# Patient Record
Sex: MALE | Race: WHITE | NOT HISPANIC OR LATINO | Employment: UNEMPLOYED | URBAN - METROPOLITAN AREA
[De-identification: names, ages, dates, MRNs, and addresses within clinical notes are randomized per-mention and may not be internally consistent; named-entity substitution may affect disease eponyms.]

---

## 2017-01-30 ENCOUNTER — OFFICE VISIT (OUTPATIENT)
Dept: ORTHOPEDIC SURGERY | Facility: CLINIC | Age: 37
End: 2017-01-30

## 2017-01-30 VITALS — WEIGHT: 200 LBS | HEIGHT: 68 IN | BODY MASS INDEX: 30.31 KG/M2

## 2017-01-30 DIAGNOSIS — M25.362 KNEE INSTABILITY, LEFT: ICD-10-CM

## 2017-01-30 DIAGNOSIS — M22.2X2 PATELLOFEMORAL STRESS SYNDROME OF LEFT KNEE: Primary | ICD-10-CM

## 2017-01-30 PROCEDURE — 99213 OFFICE O/P EST LOW 20 MIN: CPT | Performed by: NURSE PRACTITIONER

## 2017-01-30 PROCEDURE — 20610 DRAIN/INJ JOINT/BURSA W/O US: CPT | Performed by: NURSE PRACTITIONER

## 2017-01-30 RX ORDER — OXYCODONE AND ACETAMINOPHEN 7.5; 325 MG/1; MG/1
TABLET ORAL
COMMUNITY
Start: 2017-01-13

## 2017-01-30 RX ORDER — ACETAMINOPHEN 500 MG/1
TABLET ORAL
COMMUNITY
Start: 2017-01-26

## 2017-01-30 RX ORDER — GABAPENTIN 800 MG/1
TABLET ORAL
COMMUNITY
Start: 2017-01-26

## 2017-01-30 RX ORDER — DIAZEPAM 5 MG/1
TABLET ORAL
COMMUNITY
Start: 2017-01-12

## 2017-01-30 RX ADMIN — LIDOCAINE HYDROCHLORIDE 4 ML: 10 INJECTION, SOLUTION INFILTRATION; PERINEURAL at 09:33

## 2017-01-30 RX ADMIN — BUPIVACAINE HYDROCHLORIDE 4 ML: 5 INJECTION, SOLUTION EPIDURAL; INTRACAUDAL at 09:33

## 2017-01-30 RX ADMIN — TRIAMCINOLONE ACETONIDE 80 MG: 40 INJECTION, SUSPENSION INTRA-ARTICULAR; INTRAMUSCULAR at 09:33

## 2017-01-30 NOTE — MR AVS SNAPSHOT
Evaristo Colvin   1/30/2017 9:15 AM   Office Visit    Dept Phone:  [phone]   Encounter #:  [id number]    Provider:  OLIMPIA Sage   Department:  [facility]                Your Full Care Plan              Today's Medication Changes          These changes are accurate as of: 1/30/17  9:53 AM.  If you have any questions, ask your nurse or doctor.               Medication(s)that have changed:     oxyCODONE-acetaminophen 7.5-325 MG per tablet   Commonly known as:  PERCOCET   What changed:  Another medication with the same name was removed. Continue taking this medication, and follow the directions you see here.   Changed by:  OLIMPIA Sage                  Your Updated Medication List          This list is accurate as of: 1/30/17  9:53 AM.  Always use your most recent med list.                alfuzosin 10 MG 24 hr tablet   Commonly known as:  UROXATRAL       amLODIPine 5 MG tablet   Commonly known as:  NORVASC       cyclobenzaprine 10 MG tablet   Commonly known as:  FLEXERIL       diazePAM 5 MG tablet   Commonly known as:  VALIUM       EQ ACETAMINOPHEN 500 MG tablet   Generic drug:  acetaminophen       FLUoxetine 20 MG capsule   Commonly known as:  PROzac       * gabapentin 600 MG tablet   Commonly known as:  NEURONTIN       * gabapentin 800 MG tablet   Commonly known as:  NEURONTIN       lisinopril-hydrochlorothiazide 20-12.5 MG per tablet   Commonly known as:  PRINZIDE,ZESTORETIC       lithium carbonate 300 MG capsule       metoprolol tartrate 25 MG tablet   Commonly known as:  LOPRESSOR       nicotine 14 MG/24HR patch   Commonly known as:  NICODERM CQ       OLANZapine 5 MG tablet   Commonly known as:  zyPREXA       oxyCODONE-acetaminophen 7.5-325 MG per tablet   Commonly known as:  PERCOCET       ranitidine 150 MG capsule   Commonly known as:  ZANTAC       tiZANidine 4 MG tablet   Commonly known as:  ZANAFLEX       * Notice:  This list has 2  "medication(s) that are the same as other medications prescribed for you. Read the directions carefully, and ask your doctor or other care provider to review them with you.            We Performed the Following     Large Joint Arthrocentesis       Instructions     None    Patient Instructions History      Upcoming Appointments     Visit Type Date Time Department    FOLLOW UP 2017  9:15 AM [facility]    OFFICE VISIT 2017  2:45 PM [facility]      Consano Signup     [facility] Consano allows you to send messages to your doctor, view your test results, renew your prescriptions, schedule appointments, and more. To sign up, go to [employer] and click on the Sign Up Now link in the New User? box. Enter your Consano Activation Code exactly as it appears below along with the last four digits of your Social Security Number and your Date of Birth () to complete the sign-up process. If you do not sign up before the expiration date, you must request a new code.    Consano Activation Code: [email or web address]  Expires: 2017  9:49 AM    If you have questions, you can email [email or web address] or call [phone] to talk to our Consano staff. Remember, Consano is NOT to be used for urgent needs. For medical emergencies, dial 911.               Other Info from Your Visit           Your Appointments     2017  2:45 PM EST   Office Visit with OLIMPIA Sage   [facility] (--)    [address]  [facility] [address] [phone]           Arrive 15 minutes prior to appointment.              Allergies     No Known Allergies      Reason for Visit     Left Knee - Follow-up, Pain           Vital Signs     Height Weight Body Mass Index Smoking Status          68\" (172.7 cm) 200 lb (90.7 kg) 30.41 kg/m2 Current Every Day Smoker          "

## 2017-01-30 NOTE — PROGRESS NOTES
Subjective:     Patient ID: Evaristo Colvin is a 36 y.o. male.    Chief Complaint: follow-up Left knee pain and swelling    History of Present Illness  Patient is 36 y.o male who presents for follow-up left knee pain and swelling. Pain and swelling present at anterior aspect, increased with all ambulatory activities. Has continued wearing krystyna-pull lite knee brace without relief. Continues to experience increased swelling while wearing brace around anterior knee. Has recently had back surgery at outside facility. Again, unable to take NSAID's because of previous fracture of spine and now post-op. Continues to feel like knee is going to give out on him. Denies presence of numbness or tingling. Denies all other concerns present at this time.      Social History     Occupational History   • Not on file.     Social History Main Topics   • Smoking status: Current Every Day Smoker     Packs/day: 1.00     Years: 20.00   • Smokeless tobacco: Never Used   • Alcohol use No   • Drug use: No   • Sexual activity: Defer      Past Medical History   Diagnosis Date   • Anxiety    • Carpal tunnel syndrome    • Knee sprain    • Lumbosacral disc disease      Past Surgical History   Procedure Laterality Date   • Finger surgery Left 11/2013     MIDDLE       History reviewed. No pertinent family history.      Review of Systems   Constitutional: Negative for chills, diaphoresis, fever and unexpected weight change.   HENT: Negative for hearing loss, nosebleeds, sore throat and tinnitus.    Eyes: Negative for pain and visual disturbance.   Respiratory: Negative for cough, shortness of breath and wheezing.    Cardiovascular: Negative for chest pain and palpitations.   Gastrointestinal: Negative for abdominal pain, diarrhea, nausea and vomiting.   Endocrine: Negative for cold intolerance, heat intolerance and polydipsia.   Genitourinary: Negative for difficulty urinating, dysuria and hematuria.   Musculoskeletal: Positive for arthralgias, back  "pain, joint swelling and myalgias.   Skin: Negative for rash and wound.   Allergic/Immunologic: Negative for environmental allergies.   Neurological: Negative for dizziness, syncope and numbness.   Hematological: Does not bruise/bleed easily.   Psychiatric/Behavioral: Negative for dysphoric mood and sleep disturbance. The patient is not nervous/anxious.            Objective:  Physical Exam    General: No acute distress.  Eyes: conjunctiva clear; pupils equally round and reactive  ENT: external ears and nose atraumatic; oropharynx clear  CV: no peripheral edema  Resp: normal respiratory effort  Skin: no rashes or wounds; normal turgor  Psych: mood and affect appropriate; recent and remote memory intact    Vitals:    01/30/17 0909   Weight: 200 lb (90.7 kg)   Height: 68\" (172.7 cm)     Last 2 weights    01/30/17  0909   Weight: 200 lb (90.7 kg)     Body mass index is 30.41 kg/(m^2).     Left Knee Exam     Tenderness   The patient is experiencing tenderness in the patella.    Range of Motion   Extension: 0   Flexion: 120     Tests   Kanwal:  Medial - negative Lateral - negative  Lachman:  Anterior - trace    Posterior - negative  Drawer:       Anterior - negative     Posterior - negative  Varus: negative  Valgus: negative  Patellar Apprehension: negative    Other   Erythema: absent  Scars: absent  Sensation: normal  Pulse: present  Swelling: mild  Effusion: effusion present    Comments:  Negative for crepitus throughout arc of motion          Assessment:       1. Patellofemoral stress syndrome of left knee    2. Knee instability, left          Plan:  1. Discussed plan of care with patient. Wishes to proceed with corticosteroid injection left knee.  2. Fitted for drytex hinged brace left knee for stability. Will follow-up in four weeks and reassess at that time. Patient verbalized understanding of all information and agrees with plan of care. Denies all other concerns present at this time.     Large Joint " Arthrocentesis  Date/Time: 1/30/2017 9:33 AM  Consent given by: patient  Site marked: site marked  Timeout: Immediately prior to procedure a time out was called to verify the correct patient, procedure, equipment, support staff and site/side marked as required   Supporting Documentation  Indications: pain   Procedure Details  Location: knee - L knee  Preparation: Patient was prepped and draped in the usual sterile fashion  Needle size: 22 G  Approach: superior  Medications administered: 4 mL bupivacaine (PF) 0.5 %; 4 mL lidocaine 1 %; 80 mg triamcinolone acetonide 40 MG/ML  Patient tolerance: patient tolerated the procedure well with no immediate complications          CECILIA query complete.

## 2017-01-31 PROBLEM — M25.369 KNEE INSTABILITY: Status: ACTIVE | Noted: 2017-01-31

## 2017-01-31 RX ORDER — LIDOCAINE HYDROCHLORIDE 10 MG/ML
4 INJECTION, SOLUTION INFILTRATION; PERINEURAL
Status: COMPLETED | OUTPATIENT
Start: 2017-01-30 | End: 2017-01-30

## 2017-01-31 RX ORDER — BUPIVACAINE HYDROCHLORIDE 5 MG/ML
4 INJECTION, SOLUTION EPIDURAL; INTRACAUDAL
Status: COMPLETED | OUTPATIENT
Start: 2017-01-30 | End: 2017-01-30

## 2017-01-31 RX ORDER — TRIAMCINOLONE ACETONIDE 40 MG/ML
80 INJECTION, SUSPENSION INTRA-ARTICULAR; INTRAMUSCULAR
Status: COMPLETED | OUTPATIENT
Start: 2017-01-30 | End: 2017-01-30

## 2023-06-02 ENCOUNTER — OFFICE VISIT (OUTPATIENT)
Dept: FAMILY MEDICINE CLINIC | Facility: CLINIC | Age: 43
End: 2023-06-02
Payer: COMMERCIAL

## 2023-06-02 VITALS
HEART RATE: 94 BPM | SYSTOLIC BLOOD PRESSURE: 112 MMHG | BODY MASS INDEX: 26.52 KG/M2 | OXYGEN SATURATION: 95 % | WEIGHT: 175 LBS | TEMPERATURE: 97.3 F | RESPIRATION RATE: 18 BRPM | HEIGHT: 68 IN | DIASTOLIC BLOOD PRESSURE: 70 MMHG

## 2023-06-02 DIAGNOSIS — Z13.6 ENCOUNTER FOR LIPID SCREENING FOR CARDIOVASCULAR DISEASE: ICD-10-CM

## 2023-06-02 DIAGNOSIS — Z00.00 WELLNESS EXAMINATION: ICD-10-CM

## 2023-06-02 DIAGNOSIS — H66.91 ACUTE OTITIS MEDIA, RIGHT: ICD-10-CM

## 2023-06-02 DIAGNOSIS — G89.29 CHRONIC BILATERAL LOW BACK PAIN WITH LEFT-SIDED SCIATICA: Primary | ICD-10-CM

## 2023-06-02 DIAGNOSIS — Z11.59 ENCOUNTER FOR HEPATITIS C SCREENING TEST FOR LOW RISK PATIENT: ICD-10-CM

## 2023-06-02 DIAGNOSIS — M54.42 CHRONIC BILATERAL LOW BACK PAIN WITH LEFT-SIDED SCIATICA: Primary | ICD-10-CM

## 2023-06-02 DIAGNOSIS — Z13.220 ENCOUNTER FOR LIPID SCREENING FOR CARDIOVASCULAR DISEASE: ICD-10-CM

## 2023-06-02 RX ORDER — AMOXICILLIN AND CLAVULANATE POTASSIUM 875; 125 MG/1; MG/1
1 TABLET, FILM COATED ORAL 2 TIMES DAILY
Qty: 14 TABLET | Refills: 0 | Status: SHIPPED | OUTPATIENT
Start: 2023-06-02

## 2023-06-02 RX ORDER — CYCLOBENZAPRINE HCL 10 MG
10 TABLET ORAL NIGHTLY PRN
Qty: 90 TABLET | Refills: 0 | Status: SHIPPED | OUTPATIENT
Start: 2023-06-02

## 2023-06-02 NOTE — PATIENT INSTRUCTIONS
Labs today - will call with results  Antibiotics as prescribed  Muscle relaxer as prescribed  Follow up for annual physical in 3 months, sooner if needed
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
## 2023-06-03 LAB
ALBUMIN SERPL-MCNC: 5.1 G/DL (ref 4–5)
ALBUMIN/GLOB SERPL: 2 {RATIO} (ref 1.2–2.2)
ALP SERPL-CCNC: 125 IU/L (ref 44–121)
ALT SERPL-CCNC: 14 IU/L (ref 0–44)
AST SERPL-CCNC: 20 IU/L (ref 0–40)
BILIRUB SERPL-MCNC: 0.4 MG/DL (ref 0–1.2)
BUN SERPL-MCNC: 20 MG/DL (ref 6–24)
BUN/CREAT SERPL: 13 (ref 9–20)
CALCIUM SERPL-MCNC: 9.8 MG/DL (ref 8.7–10.2)
CHLORIDE SERPL-SCNC: 104 MMOL/L (ref 96–106)
CHOLEST SERPL-MCNC: 162 MG/DL (ref 100–199)
CO2 SERPL-SCNC: 21 MMOL/L (ref 20–29)
CREAT SERPL-MCNC: 1.5 MG/DL (ref 0.76–1.27)
EGFRCR SERPLBLD CKD-EPI 2021: 59 ML/MIN/1.73
ERYTHROCYTE [DISTWIDTH] IN BLOOD BY AUTOMATED COUNT: 12.5 % (ref 11.6–15.4)
GLOBULIN SER CALC-MCNC: 2.5 G/DL (ref 1.5–4.5)
GLUCOSE SERPL-MCNC: 75 MG/DL (ref 70–99)
HCT VFR BLD AUTO: 47.2 % (ref 37.5–51)
HCV IGG SERPL QL IA: NON REACTIVE
HDLC SERPL-MCNC: 42 MG/DL
HGB BLD-MCNC: 15.4 G/DL (ref 13–17.7)
LDLC SERPL CALC-MCNC: 101 MG/DL (ref 0–99)
MCH RBC QN AUTO: 30 PG (ref 26.6–33)
MCHC RBC AUTO-ENTMCNC: 32.6 G/DL (ref 31.5–35.7)
MCV RBC AUTO: 92 FL (ref 79–97)
PLATELET # BLD AUTO: 281 X10E3/UL (ref 150–450)
POTASSIUM SERPL-SCNC: 5.1 MMOL/L (ref 3.5–5.2)
PROT SERPL-MCNC: 7.6 G/DL (ref 6–8.5)
RBC # BLD AUTO: 5.13 X10E6/UL (ref 4.14–5.8)
SODIUM SERPL-SCNC: 143 MMOL/L (ref 134–144)
TRIGL SERPL-MCNC: 105 MG/DL (ref 0–149)
VLDLC SERPL CALC-MCNC: 19 MG/DL (ref 5–40)
WBC # BLD AUTO: 13.3 X10E3/UL (ref 3.4–10.8)

## 2023-06-06 DIAGNOSIS — R94.4 DECREASED GFR: Primary | ICD-10-CM

## 2023-06-07 ENCOUNTER — TELEPHONE (OUTPATIENT)
Dept: FAMILY MEDICINE CLINIC | Facility: CLINIC | Age: 43
End: 2023-06-07
Payer: COMMERCIAL

## 2023-06-07 NOTE — TELEPHONE ENCOUNTER
If tylenol does not  help at all, I would recommend pain management eval to see if there is another alternative.  Let me know if he would like to proceed with this.

## 2023-06-07 NOTE — TELEPHONE ENCOUNTER
----- Message from Aimee Stover MA sent at 6/6/2023  4:38 PM EDT -----  He takes a lot of ibuprofen anything else to help other than tylenol. Tylenol doesn't help at all.

## 2023-07-26 ENCOUNTER — HOSPITAL ENCOUNTER (OUTPATIENT)
Dept: GENERAL RADIOLOGY | Facility: HOSPITAL | Age: 43
Discharge: HOME | End: 2023-07-26
Payer: COMMERCIAL

## 2023-07-26 ENCOUNTER — OFFICE VISIT (OUTPATIENT)
Dept: FAMILY MEDICINE CLINIC | Facility: CLINIC | Age: 43
End: 2023-07-26
Payer: COMMERCIAL

## 2023-07-26 VITALS
HEART RATE: 80 BPM | TEMPERATURE: 98.4 F | WEIGHT: 183 LBS | SYSTOLIC BLOOD PRESSURE: 116 MMHG | BODY MASS INDEX: 27.74 KG/M2 | DIASTOLIC BLOOD PRESSURE: 80 MMHG | HEIGHT: 68 IN | RESPIRATION RATE: 18 BRPM

## 2023-07-26 DIAGNOSIS — F41.9 ANXIETY AND DEPRESSION: ICD-10-CM

## 2023-07-26 DIAGNOSIS — F15.90 STIMULANT USE DISORDER: ICD-10-CM

## 2023-07-26 DIAGNOSIS — M77.01 MEDIAL EPICONDYLITIS OF RIGHT ELBOW: Primary | ICD-10-CM

## 2023-07-26 DIAGNOSIS — M79.644 PAIN OF RIGHT MIDDLE FINGER: ICD-10-CM

## 2023-07-26 DIAGNOSIS — R68.82 DECREASED LIBIDO: ICD-10-CM

## 2023-07-26 DIAGNOSIS — R53.83 OTHER FATIGUE: ICD-10-CM

## 2023-07-26 DIAGNOSIS — F32.A ANXIETY AND DEPRESSION: ICD-10-CM

## 2023-07-26 PROCEDURE — 73140 X-RAY EXAM OF FINGER(S): CPT

## 2023-07-26 PROCEDURE — 1159F MED LIST DOCD IN RCRD: CPT

## 2023-07-26 PROCEDURE — 1160F RVW MEDS BY RX/DR IN RCRD: CPT

## 2023-07-26 PROCEDURE — 99214 OFFICE O/P EST MOD 30 MIN: CPT
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
## 2023-07-26 NOTE — PATIENT INSTRUCTIONS
Labs today - will call with results  Diclofenac as prescribed. Referral to OT.   Xray ordered of right middle finger.   Follow up in 3 weeks to recheck on depression / anxiety, sooner if needed. Plan to repeat labs (CMP) at that visit.

## 2023-07-27 LAB
25(OH)D3+25(OH)D2 SERPL-MCNC: 22.1 NG/ML (ref 30–100)
ERYTHROCYTE [DISTWIDTH] IN BLOOD BY AUTOMATED COUNT: 12.6 % (ref 12.3–15.4)
HCT VFR BLD AUTO: 42.9 % (ref 37.5–51)
HGB BLD-MCNC: 14.5 G/DL (ref 13–17.7)
MCH RBC QN AUTO: 30.5 PG (ref 26.6–33)
MCHC RBC AUTO-ENTMCNC: 33.8 G/DL (ref 31.5–35.7)
MCV RBC AUTO: 90.1 FL (ref 79–97)
PLATELET # BLD AUTO: 214 10*3/MM3 (ref 140–450)
RBC # BLD AUTO: 4.76 10*6/MM3 (ref 4.14–5.8)
TESTOST SERPL-MCNC: 279 NG/DL (ref 264–916)
TSH SERPL DL<=0.005 MIU/L-ACNC: 2.93 UIU/ML (ref 0.27–4.2)
VIT B12 SERPL-MCNC: 409 PG/ML (ref 211–946)
WBC # BLD AUTO: 7.17 10*3/MM3 (ref 3.4–10.8)

## 2023-07-31 ENCOUNTER — TELEPHONE (OUTPATIENT)
Dept: FAMILY MEDICINE CLINIC | Facility: CLINIC | Age: 43
End: 2023-07-31
Payer: COMMERCIAL

## 2023-08-01 DIAGNOSIS — M77.01 MEDIAL EPICONDYLITIS OF RIGHT ELBOW: ICD-10-CM

## 2023-08-03 ENCOUNTER — OFFICE VISIT (OUTPATIENT)
Dept: FAMILY MEDICINE CLINIC | Facility: CLINIC | Age: 43
End: 2023-08-03
Payer: COMMERCIAL

## 2023-08-03 VITALS
TEMPERATURE: 97.5 F | SYSTOLIC BLOOD PRESSURE: 116 MMHG | HEIGHT: 68 IN | RESPIRATION RATE: 18 BRPM | HEART RATE: 91 BPM | OXYGEN SATURATION: 97 % | BODY MASS INDEX: 27.58 KG/M2 | DIASTOLIC BLOOD PRESSURE: 90 MMHG | WEIGHT: 182 LBS

## 2023-08-03 DIAGNOSIS — N52.9 ERECTILE DYSFUNCTION, UNSPECIFIED ERECTILE DYSFUNCTION TYPE: Primary | ICD-10-CM

## 2023-08-03 PROCEDURE — 1159F MED LIST DOCD IN RCRD: CPT

## 2023-08-03 PROCEDURE — 99213 OFFICE O/P EST LOW 20 MIN: CPT

## 2023-08-03 PROCEDURE — 1160F RVW MEDS BY RX/DR IN RCRD: CPT

## 2023-08-03 RX ORDER — TADALAFIL 10 MG/1
10 TABLET ORAL DAILY PRN
Qty: 10 TABLET | Refills: 1 | Status: SHIPPED | OUTPATIENT
Start: 2023-08-03

## 2023-08-03 NOTE — PROGRESS NOTES
"Chief Complaint   Patient presents with    Erectile Dysfunction     For a while, x 1 year getting worse.        Subjective      Evaristo Colvin is a 42 y.o. who presents for follow up on ED.  Labs were normal, but he continues to struggle with ED.  No pain. No abnormal curvature or change in shape of penis.      The following portions of the patient's history were reviewed and updated as appropriate: allergies, current medications, past family history, past medical history, past social history, past surgical history, and problem list.    Review of Systems   Constitutional:  Negative for chills and fever.   Respiratory:  Negative for chest tightness and shortness of breath.    Cardiovascular: Negative.    Genitourinary:  Positive for erectile dysfunction. Negative for discharge, penile pain, penile swelling, scrotal swelling and testicular pain.   Psychiatric/Behavioral:  Positive for suicidal ideas.      Objective   Vital Signs:  /90   Pulse 91   Temp 97.5 øF (36.4 øC)   Resp 18   Ht 172.7 cm (68\")   Wt 82.6 kg (182 lb)   SpO2 97%   BMI 27.67 kg/mý             Physical Exam  Vitals and nursing note reviewed.   Constitutional:       Appearance: Normal appearance.   Cardiovascular:      Rate and Rhythm: Normal rate and regular rhythm.      Heart sounds: Normal heart sounds.   Pulmonary:      Breath sounds: Normal breath sounds.   Genitourinary:     Penis: Normal.       Testes: Normal.   Neurological:      General: No focal deficit present.      Mental Status: He is alert and oriented to person, place, and time.   Psychiatric:         Mood and Affect: Mood normal.         Behavior: Behavior normal.        Result Review                     Assessment and Plan  Diagnoses and all orders for this visit:    1. Erectile dysfunction, unspecified erectile dysfunction type (Primary)  -     tadalafil (Cialis) 10 MG tablet; Take 1 tablet by mouth Daily As Needed for Erectile Dysfunction.  Dispense: 10 tablet; " Refill: 1  -     Testosterone      After discussion with patient regarding option for referral to urology versus trying medication, he opts to try medication.  We discussed risks and benefits of medication and patient verbalized understanding. The patient was educated about tadalafil.  He was counseled on appropriate use, timing and the need for sexual stimulation.  In addition, he understands not to use this medication with nitrates. He was instructed to take the medication about 1 hour prior to sexual activity.  Side effects were explained to the patient. Lastly, he was instructed to go immediately to his nearest emergency room in the event he developed an erection lasting longer than 3 hours. He voiced understanding of all these instructions and the importance of seeking prompt medical attention for an erection lasting longer than 3 hours.     Follow up in 1 month for recheck. Consider increasing dose versus referral to urology if ineffective.         Patient Instructions   Medication as prescribed.   Follow up as planned in 4 weeks     Follow Up  No follow-ups on file.  Patient was given instructions and counseling regarding his condition or for health maintenance advice. Please see specific information pulled into the AVS if appropriate.

## 2023-08-04 LAB — TESTOST SERPL-MCNC: 319 NG/DL (ref 264–916)

## 2023-08-30 ENCOUNTER — TELEPHONE (OUTPATIENT)
Dept: FAMILY MEDICINE CLINIC | Facility: CLINIC | Age: 43
End: 2023-08-30
Payer: COMMERCIAL

## 2023-08-30 NOTE — TELEPHONE ENCOUNTER
Patient sees me next week. Try to see if there is an appointment available at the end of this week instead so we can discuss and make a plan. If not, keep appointment next week as planned.

## 2023-08-30 NOTE — TELEPHONE ENCOUNTER
Caller: Evaristo Colvin    Relationship: Self    Best call back number: [phone]     What is the medical concern/diagnosis: HAND ISSUES     What specialty or service is being requested: HAND SPECIALIST / ORTHOPEDIC SURGERY     What is the provider, practice or medical service name: [facility]     What is the office location: [facility]       Any additional details: ADRIANA RING REFERRED PATIENT TO PHYSICAL THERAPY, AND HIS PHYSICAL THERAPIST STATED HE NEEDS TO BE REFERRED TO A HAND SPECIALIST.    PATIENT STATED HE WAS TOLD THAT ANDREINA ALKA WOULD HAVE A LOCATION IN [place] AND IF SO HE WOULD LIKE TO BE SCHEDULED IN THAT LOCATION.    IF NOT HE IS OK BEING SCHEDULED WITH SOMEONE ELSE IN [place]    HE WAS ALSO TOLD THAT HE NEEDS IMAGING DONE ASAP, HE IS NOT SURE IF ITS XRAY OR MRI. PLEASE ADVISE AND CALL PATIENT

## 2023-08-31 DIAGNOSIS — M54.42 CHRONIC BILATERAL LOW BACK PAIN WITH LEFT-SIDED SCIATICA: ICD-10-CM

## 2023-08-31 DIAGNOSIS — M77.01 MEDIAL EPICONDYLITIS OF RIGHT ELBOW: ICD-10-CM

## 2023-08-31 DIAGNOSIS — G89.29 CHRONIC BILATERAL LOW BACK PAIN WITH LEFT-SIDED SCIATICA: ICD-10-CM

## 2023-08-31 NOTE — TELEPHONE ENCOUNTER
Caller: Evaristo Colvin    Relationship: Self    Best call back number: [phone] - OK TO LEAVE DETAILED MESSAGE IF NO ANSWER  ALSO OK TO RESPOND THRU MYCHART- PREFERS PHONE CALLS    Requested Prescriptions:   Requested Prescriptions     Pending Prescriptions Disp Refills    cyclobenzaprine (FLEXERIL) 10 MG tablet 90 tablet 0     Sig: Take 1 tablet by mouth At Night As Needed for Muscle Spasms.    diclofenac (VOLTAREN) 50 MG EC tablet 60 tablet 0     Sig: Take 1 tablet by mouth 2 (Two) Times a Day.        Pharmacy where request should be sent:  [facility]/pharmacy #[id number] - [place], [place] - [address] AT [facility] [address] - [phone]  - [phone] FX     Last office visit with prescribing clinician: 8/3/2023   Last telemedicine visit with prescribing clinician: Visit date not found   Next office visit with prescribing clinician: 9/5/2023     Additional details provided by patient: PLEASE SEND 90 DAY SUPPLY WITH REFILLS. ASKING IF CYCLOBENZAPRINE CAN BE INCREASED TO TWICE PER DAY.    Does the patient have less than a 3 day supply:  [x] Yes  SUNDAY WILL BE LAST DOSE    If the office needs to give you a call back, can they leave a voicemail: [x] Yes [] No    Alejandra Monreal Rep   08/31/23 10:24 EDT

## 2023-09-01 RX ORDER — CYCLOBENZAPRINE HCL 10 MG
10 TABLET ORAL 2 TIMES DAILY PRN
Qty: 90 TABLET | Refills: 0 | Status: SHIPPED | OUTPATIENT
Start: 2023-09-01

## 2023-09-05 ENCOUNTER — OFFICE VISIT (OUTPATIENT)
Dept: FAMILY MEDICINE CLINIC | Facility: CLINIC | Age: 43
End: 2023-09-05
Payer: COMMERCIAL

## 2023-09-05 VITALS
BODY MASS INDEX: 31.22 KG/M2 | OXYGEN SATURATION: 95 % | RESPIRATION RATE: 14 BRPM | SYSTOLIC BLOOD PRESSURE: 112 MMHG | WEIGHT: 206 LBS | TEMPERATURE: 97.1 F | DIASTOLIC BLOOD PRESSURE: 78 MMHG | HEIGHT: 68 IN | HEART RATE: 90 BPM

## 2023-09-05 DIAGNOSIS — M77.01 MEDIAL EPICONDYLITIS OF RIGHT ELBOW: ICD-10-CM

## 2023-09-05 DIAGNOSIS — M79.601 RIGHT ARM PAIN: ICD-10-CM

## 2023-09-05 DIAGNOSIS — R30.0 DYSURIA: ICD-10-CM

## 2023-09-05 DIAGNOSIS — D72.829 LEUKOCYTOSIS, UNSPECIFIED TYPE: ICD-10-CM

## 2023-09-05 DIAGNOSIS — Z00.00 WELLNESS EXAMINATION: Primary | ICD-10-CM

## 2023-09-05 DIAGNOSIS — R94.4 DECREASED GFR: ICD-10-CM

## 2023-09-05 DIAGNOSIS — K21.9 GASTROESOPHAGEAL REFLUX DISEASE, UNSPECIFIED WHETHER ESOPHAGITIS PRESENT: ICD-10-CM

## 2023-09-05 DIAGNOSIS — N52.9 ERECTILE DYSFUNCTION, UNSPECIFIED ERECTILE DYSFUNCTION TYPE: ICD-10-CM

## 2023-09-05 LAB
ALBUMIN SERPL-MCNC: 4.5 G/DL (ref 3.5–5.2)
ALBUMIN/GLOB SERPL: 2.4 G/DL
ALP SERPL-CCNC: 94 U/L (ref 39–117)
ALT SERPL-CCNC: 48 U/L (ref 1–41)
AST SERPL-CCNC: 33 U/L (ref 1–40)
BASOPHILS # BLD AUTO: 0.08 10*3/MM3 (ref 0–0.2)
BASOPHILS NFR BLD AUTO: 0.9 % (ref 0–1.5)
BILIRUB BLD-MCNC: NEGATIVE MG/DL
BILIRUB SERPL-MCNC: <0.2 MG/DL (ref 0–1.2)
BUN SERPL-MCNC: 15 MG/DL (ref 6–20)
BUN/CREAT SERPL: 15.6 (ref 7–25)
CALCIUM SERPL-MCNC: 9.8 MG/DL (ref 8.6–10.5)
CHLORIDE SERPL-SCNC: 104 MMOL/L (ref 98–107)
CLARITY, POC: CLEAR
CO2 SERPL-SCNC: 28.9 MMOL/L (ref 22–29)
COLOR UR: YELLOW
CREAT SERPL-MCNC: 0.96 MG/DL (ref 0.76–1.27)
EGFRCR SERPLBLD CKD-EPI 2021: 100.6 ML/MIN/1.73
EOSINOPHIL # BLD AUTO: 0.2 10*3/MM3 (ref 0–0.4)
EOSINOPHIL NFR BLD AUTO: 2.2 % (ref 0.3–6.2)
ERYTHROCYTE [DISTWIDTH] IN BLOOD BY AUTOMATED COUNT: 13 % (ref 12.3–15.4)
GLOBULIN SER CALC-MCNC: 1.9 GM/DL
GLUCOSE SERPL-MCNC: 98 MG/DL (ref 65–99)
GLUCOSE UR STRIP-MCNC: NEGATIVE MG/DL
HCT VFR BLD AUTO: 41.7 % (ref 37.5–51)
HGB BLD-MCNC: 14.2 G/DL (ref 13–17.7)
IMM GRANULOCYTES # BLD AUTO: 0.07 10*3/MM3 (ref 0–0.05)
IMM GRANULOCYTES NFR BLD AUTO: 0.8 % (ref 0–0.5)
KETONES UR QL: NEGATIVE
LEUKOCYTE EST, POC: NEGATIVE
LYMPHOCYTES # BLD AUTO: 2.39 10*3/MM3 (ref 0.7–3.1)
LYMPHOCYTES NFR BLD AUTO: 25.7 % (ref 19.6–45.3)
MCH RBC QN AUTO: 31.4 PG (ref 26.6–33)
MCHC RBC AUTO-ENTMCNC: 34.1 G/DL (ref 31.5–35.7)
MCV RBC AUTO: 92.3 FL (ref 79–97)
MONOCYTES # BLD AUTO: 0.72 10*3/MM3 (ref 0.1–0.9)
MONOCYTES NFR BLD AUTO: 7.7 % (ref 5–12)
NEUTROPHILS # BLD AUTO: 5.84 10*3/MM3 (ref 1.7–7)
NEUTROPHILS NFR BLD AUTO: 62.7 % (ref 42.7–76)
NITRITE UR-MCNC: NEGATIVE MG/ML
NRBC BLD AUTO-RTO: 0.2 /100 WBC (ref 0–0.2)
PH UR: 6 [PH] (ref 5–8)
PLATELET # BLD AUTO: 216 10*3/MM3 (ref 140–450)
POTASSIUM SERPL-SCNC: 5.1 MMOL/L (ref 3.5–5.2)
PROT SERPL-MCNC: 6.4 G/DL (ref 6–8.5)
PROT UR STRIP-MCNC: NEGATIVE MG/DL
RBC # BLD AUTO: 4.52 10*6/MM3 (ref 4.14–5.8)
RBC # UR STRIP: NEGATIVE /UL
SODIUM SERPL-SCNC: 140 MMOL/L (ref 136–145)
SP GR UR: 1.03 (ref 1–1.03)
UROBILINOGEN UR QL: NORMAL
WBC # BLD AUTO: 9.3 10*3/MM3 (ref 3.4–10.8)

## 2023-09-05 RX ORDER — TRAZODONE HYDROCHLORIDE 100 MG/1
50-100 TABLET ORAL
COMMUNITY
Start: 2023-08-31

## 2023-09-05 RX ORDER — OMEPRAZOLE 20 MG/1
20 CAPSULE, DELAYED RELEASE ORAL DAILY
Qty: 30 CAPSULE | Refills: 0 | Status: SHIPPED | OUTPATIENT
Start: 2023-09-05

## 2023-09-05 RX ORDER — ARIPIPRAZOLE 5 MG/1
1 TABLET ORAL DAILY
COMMUNITY
Start: 2023-09-01

## 2023-09-05 RX ORDER — NICOTINE 21 MG/24HR
1 PATCH, TRANSDERMAL 24 HOURS TRANSDERMAL EVERY 24 HOURS
COMMUNITY
Start: 2023-08-16

## 2023-09-05 NOTE — PROGRESS NOTES
Chief Complaint   Patient presents with    Annual Exam       Subjective      Evaristo Colvin is a 42 y.o. who presents for follow up on right elbow pain.  States OT advised him to see specialist at this point. No improvement with 6 weeks OT at [facility] hand [facility] physical therapy.   Seeing psychiatrist and they put him on abilify.  States he follows up again in 2 weeks.   Has gained about 24 lbs in a month or so.  Continues to have some anxiety.   No issues with cravings, starting to taper suboxone.   Cialis - works sometimes.  Sometimes it does not work.  States it may be depression/anxiety more than ED at this point.   Did start going to gym; does his OT as well.       The following portions of the patient's history were reviewed and updated as appropriate: allergies, current medications, past family history, past medical history, past social history, past surgical history, and problem list.    Review of Systems   Constitutional:  Negative for chills and fever.   HENT: Negative.     Eyes: Negative.    Respiratory:  Negative for chest tightness and shortness of breath.    Cardiovascular:  Positive for chest pain (with anxiety sometimes). Negative for palpitations.   Gastrointestinal:  Positive for GERD. Negative for abdominal pain, blood in stool, constipation, diarrhea, nausea and vomiting.   Genitourinary:  Positive for dysuria and frequency. Negative for difficulty urinating, discharge, hematuria, nocturia, penile pain, penile swelling, scrotal swelling and testicular pain.   Musculoskeletal:  Positive for back pain (chronic lower, history of surgery).   Neurological:  Positive for dizziness (with changing positions sometimes - if he stands up too fast) and numbness (right arm and hand sometimes). Negative for headache.   Psychiatric/Behavioral:  Positive for sleep disturbance (not sleeping well d/t anxiety), depressed mood and stress. Negative for suicidal ideas. The patient is nervous/anxious.   "    Objective   Vital Signs:  /78   Pulse 90   Temp 97.1 °F (36.2 °C)   Resp 14   Ht 172.7 cm (68\")   Wt 93.4 kg (206 lb)   SpO2 95%   BMI 31.32 kg/m²     BMI is >= 30 and <35. (Class 1 Obesity). The following options were offered after discussion;: exercise counseling/recommendations and nutrition counseling/recommendations        Physical Exam  Vitals and nursing note reviewed.   Constitutional:       General: He is not in acute distress.     Appearance: Normal appearance. He is not ill-appearing.   HENT:      Head: Normocephalic and atraumatic.      Right Ear: Tympanic membrane, ear canal and external ear normal.      Left Ear: Tympanic membrane, ear canal and external ear normal.      Nose: Nose normal.      Mouth/Throat:      Mouth: Mucous membranes are moist.      Pharynx: No oropharyngeal exudate or posterior oropharyngeal erythema.   Eyes:      General:         Right eye: No discharge.         Left eye: No discharge.      Extraocular Movements: Extraocular movements intact.      Pupils: Pupils are equal, round, and reactive to light.   Cardiovascular:      Rate and Rhythm: Normal rate and regular rhythm.      Pulses: Normal pulses.      Heart sounds: Normal heart sounds.   Pulmonary:      Effort: Pulmonary effort is normal.      Breath sounds: Normal breath sounds.   Abdominal:      General: Bowel sounds are normal.      Palpations: Abdomen is soft. There is no mass.      Tenderness: There is no abdominal tenderness. There is no guarding.   Musculoskeletal:         General: Normal range of motion.      Right elbow: No swelling or deformity. Tenderness present in medial epicondyle.      Cervical back: Normal range of motion.      Comments: Elbow - wearing brace from OT that limits ROM    Skin:     General: Skin is warm and dry.   Neurological:      General: No focal deficit present.      Mental Status: He is alert and oriented to person, place, and time.        Result Review                   "   Assessment and Plan  Diagnoses and all orders for this visit:    1. Wellness examination (Primary)    2. Medial epicondylitis of right elbow  -     Ambulatory Referral to Hand Surgery    3. Right arm pain  -     Ambulatory Referral to Hand Surgery    4. Dysuria  -     POC Urinalysis Dipstick    5. Decreased GFR  -     Comprehensive Metabolic Panel    6. Leukocytosis, unspecified type  -     CBC & Differential    7. Gastroesophageal reflux disease, unspecified whether esophagitis present  -     omeprazole (priLOSEC) 20 MG capsule; Take 1 capsule by mouth Daily.  Dispense: 30 capsule; Refill: 0    8. Erectile dysfunction, unspecified erectile dysfunction type        Consider referral to urology at follow up. Negative U/A today.  Patient is agreeable with waiting for follow up to see if symptoms resolve.  Patient believes dysuria could be result of new medication, Abilify.  Medial epicondylitis/right arm pain-referral to hand surgery.  Continue OT.  Patient agrees with plan  Decreased GFR/leukocytosis-repeat labs today, will call with results.  GERD-omeprazole as prescribed.  Patient to follow-up in 6 weeks.  ED-states Cialis works some of the time, possibility anxiety/depression are contributing to ED.  Patient would like to wait until follow-up.  Consider referral to urology/changing Cialis to different medication.        There are no Patient Instructions on file for this visit.    Discussed medications prescribed and OTC medications recommended.  Discussed medication safety, possible side effects and how to take or administer medications. Instructed patient to report any adverse reactions, side effects or concerns.     Reviewed physical exam findings and plan with patient who verbalized understanding and agrees with plan of care. Patient was given opportunity to ask questions and all concerns were addressed prior to the conclusion of today's visit.     Follow Up  Return in about 6 weeks (around 10/17/2023) for  Recheck ED / anxiety/depression.  Patient was given instructions and counseling regarding his condition or for health maintenance advice. Please see specific information pulled into the AVS if appropriate.     Note to patient: The 21st Century Cures Act makes medical notes like these available to patients in the interest of transparency. However, be advised this is a medical document. It is intended as peer to peer communication. It is written in medical language and may contain abbreviations or verbiage that are unfamiliar. It may appear blunt or direct. Medical documents are intended to carry relevant information, facts as evident, and the clinical opinion of the provider.

## 2023-09-06 DIAGNOSIS — K21.9 GASTROESOPHAGEAL REFLUX DISEASE, UNSPECIFIED WHETHER ESOPHAGITIS PRESENT: ICD-10-CM

## 2023-09-07 RX ORDER — OMEPRAZOLE 20 MG/1
CAPSULE, DELAYED RELEASE ORAL
Qty: 30 CAPSULE | Refills: 0 | OUTPATIENT
Start: 2023-09-07

## 2023-09-21 ENCOUNTER — TELEPHONE (OUTPATIENT)
Dept: FAMILY MEDICINE CLINIC | Facility: CLINIC | Age: 43
End: 2023-09-21
Payer: COMMERCIAL

## 2023-09-21 NOTE — TELEPHONE ENCOUNTER
Caller: Evaristo Colvin    Relationship: Self    Best call back number: [phone]    What medication are you requesting: ANTI-BIOTICS     What are your current symptoms: SORE THROAT, EARACHE, FEVER, CONGESTION     How long have you been experiencing symptoms: 2 DAYS     Have you had these symptoms before:    [x] Yes  [] No    Have you been treated for these symptoms before:   [x] Yes  [] No    If a prescription is needed, what is your preferred pharmacy and phone number: [id number] - [place], [place] - [address] [place] [address] AT [place] [address] - [phone]  - [phone]      Additional notes: PATIENT'S DAUGHTER WAS DIAGNOSED WITH STREP B LAST WEEK

## 2023-09-21 NOTE — TELEPHONE ENCOUNTER
Patient needs eval in clinic with me. I have same day spots available tomorrow. Otherwise, he can go to [facility] for further evaluation.

## 2023-09-21 NOTE — TELEPHONE ENCOUNTER
PATIENT CALLED BACK. NO ANSWER AT OFFICE. HE STATED HE HAS NOT BEEN TESTED FOR ANYTHING YET.  PLEASE CALL PATIENT BACK TO [facility].  THANK YOU.

## 2023-09-22 ENCOUNTER — OFFICE VISIT (OUTPATIENT)
Dept: FAMILY MEDICINE CLINIC | Facility: CLINIC | Age: 43
End: 2023-09-22
Payer: COMMERCIAL

## 2023-09-22 VITALS
HEART RATE: 102 BPM | HEIGHT: 68 IN | WEIGHT: 212 LBS | TEMPERATURE: 97.8 F | DIASTOLIC BLOOD PRESSURE: 80 MMHG | OXYGEN SATURATION: 99 % | SYSTOLIC BLOOD PRESSURE: 136 MMHG | RESPIRATION RATE: 18 BRPM | BODY MASS INDEX: 32.13 KG/M2

## 2023-09-22 DIAGNOSIS — H66.001 ACUTE SUPPURATIVE OTITIS MEDIA OF RIGHT EAR WITHOUT SPONTANEOUS RUPTURE OF TYMPANIC MEMBRANE, RECURRENCE NOT SPECIFIED: ICD-10-CM

## 2023-09-22 DIAGNOSIS — J02.9 SORE THROAT: Primary | ICD-10-CM

## 2023-09-22 LAB
EXPIRATION DATE: NORMAL
INTERNAL CONTROL: NORMAL
Lab: NORMAL
S PYO AG THROAT QL: NEGATIVE

## 2023-09-22 PROCEDURE — 99213 OFFICE O/P EST LOW 20 MIN: CPT

## 2023-09-22 PROCEDURE — 87880 STREP A ASSAY W/OPTIC: CPT

## 2023-09-22 RX ORDER — HYDROXYZINE HYDROCHLORIDE 25 MG/1
25-50 TABLET, FILM COATED ORAL
COMMUNITY
Start: 2023-09-14

## 2023-09-22 RX ORDER — LAMOTRIGINE 25 MG/1
TABLET ORAL
COMMUNITY
Start: 2023-09-14

## 2023-09-22 RX ORDER — IBUPROFEN 800 MG/1
TABLET ORAL
COMMUNITY
Start: 2023-09-19

## 2023-09-22 RX ORDER — AMOXICILLIN AND CLAVULANATE POTASSIUM 875; 125 MG/1; MG/1
1 TABLET, FILM COATED ORAL 2 TIMES DAILY
Qty: 20 TABLET | Refills: 0 | Status: SHIPPED | OUTPATIENT
Start: 2023-09-22

## 2023-09-22 NOTE — PROGRESS NOTES
"Chief Complaint   Patient presents with    Nasal Congestion     X 3 days with sore throat and can't hear out of rt ear, productive coughing  Daughter was strep B       Subjective      Evaristo Colvin is a 42 y.o. who presents for sore throat, congestion and body aches.  States he has been taking dayquil and ibuprofen.   Daughter has strep.  Still has sore throat.      The following portions of the patient's history were reviewed and updated as appropriate: allergies, current medications, past family history, past medical history, past social history, past surgical history, and problem list.    Review of Systems   Constitutional:  Negative for chills and fever.   HENT:  Positive for congestion and sore throat.    Respiratory:  Negative for chest tightness and shortness of breath.    Cardiovascular:  Negative for chest pain and palpitations.   Musculoskeletal:  Positive for myalgias.     Objective   Vital Signs:  /80   Pulse 102   Temp 97.8 °F (36.6 °C)   Resp 18   Ht 172.7 cm (68\")   Wt 96.2 kg (212 lb)   SpO2 99%   BMI 32.23 kg/m²               Physical Exam  Vitals and nursing note reviewed.   Constitutional:       Appearance: Normal appearance.   HENT:      Right Ear: Ear canal and external ear normal. A middle ear effusion (yellowish) is present. Tympanic membrane is erythematous and bulging.      Left Ear: Tympanic membrane, ear canal and external ear normal.      Nose: Nose normal.      Mouth/Throat:      Pharynx: Posterior oropharyngeal erythema present.   Cardiovascular:      Rate and Rhythm: Normal rate and regular rhythm.      Heart sounds: Normal heart sounds.   Pulmonary:      Breath sounds: Normal breath sounds.   Neurological:      Mental Status: He is alert.        Result Review                     Assessment and Plan  Diagnoses and all orders for this visit:    1. Sore throat (Primary)  -     POCT rapid strep A    2. Acute suppurative otitis media of right ear without spontaneous rupture " of tympanic membrane, recurrence not specified  -     amoxicillin-clavulanate (AUGMENTIN) 875-125 MG per tablet; Take 1 tablet by mouth 2 (Two) Times a Day.  Dispense: 20 tablet; Refill: 0      Negative strep in clinic.  Patient PE consistent with otitis media. Medication as prescribed. Follow up if worsening or no improvement.         Discussed medications prescribed and OTC medications recommended.  Discussed medication safety, possible side effects and how to take or administer medications. Instructed patient to report any adverse reactions, side effects or concerns.     Reviewed physical exam findings and plan with patient who verbalized understanding and agrees with plan of care. Patient was given opportunity to ask questions and all concerns were addressed prior to the conclusion of today's visit.     Follow Up  No follow-ups on file.  Patient was given instructions and counseling regarding his condition or for health maintenance advice. Please see specific information pulled into the AVS if appropriate.     Note to patient: The 21st Century Cures Act makes medical notes like these available to patients in the interest of transparency. However, be advised this is a medical document. It is intended as peer to peer communication. It is written in medical language and may contain abbreviations or verbiage that are unfamiliar. It may appear blunt or direct. Medical documents are intended to carry relevant information, facts as evident, and the clinical opinion of the provider.

## 2023-09-30 DIAGNOSIS — M54.42 CHRONIC BILATERAL LOW BACK PAIN WITH LEFT-SIDED SCIATICA: ICD-10-CM

## 2023-09-30 DIAGNOSIS — K21.9 GASTROESOPHAGEAL REFLUX DISEASE, UNSPECIFIED WHETHER ESOPHAGITIS PRESENT: ICD-10-CM

## 2023-09-30 DIAGNOSIS — G89.29 CHRONIC BILATERAL LOW BACK PAIN WITH LEFT-SIDED SCIATICA: ICD-10-CM

## 2023-10-03 RX ORDER — CYCLOBENZAPRINE HCL 10 MG
TABLET ORAL
Qty: 90 TABLET | Refills: 0 | Status: SHIPPED | OUTPATIENT
Start: 2023-10-03

## 2023-10-03 RX ORDER — OMEPRAZOLE 20 MG/1
CAPSULE, DELAYED RELEASE ORAL
Qty: 30 CAPSULE | Refills: 3 | Status: SHIPPED | OUTPATIENT
Start: 2023-10-03

## 2023-10-20 ENCOUNTER — OFFICE VISIT (OUTPATIENT)
Dept: FAMILY MEDICINE CLINIC | Facility: CLINIC | Age: 43
End: 2023-10-20
Payer: COMMERCIAL

## 2023-10-20 VITALS
TEMPERATURE: 97.1 F | WEIGHT: 216 LBS | BODY MASS INDEX: 32.74 KG/M2 | SYSTOLIC BLOOD PRESSURE: 112 MMHG | HEART RATE: 109 BPM | RESPIRATION RATE: 18 BRPM | HEIGHT: 68 IN | DIASTOLIC BLOOD PRESSURE: 74 MMHG | OXYGEN SATURATION: 95 %

## 2023-10-20 DIAGNOSIS — A08.4 VIRAL GASTROENTERITIS: Primary | ICD-10-CM

## 2023-10-20 DIAGNOSIS — F41.9 ANXIETY: ICD-10-CM

## 2023-10-20 LAB
EXPIRATION DATE: NORMAL
FLUAV AG UPPER RESP QL IA.RAPID: NOT DETECTED
FLUBV AG UPPER RESP QL IA.RAPID: NOT DETECTED
INTERNAL CONTROL: NORMAL
Lab: NORMAL
SARS-COV-2 AG UPPER RESP QL IA.RAPID: NOT DETECTED

## 2023-10-20 PROCEDURE — 87428 SARSCOV & INF VIR A&B AG IA: CPT

## 2023-10-20 PROCEDURE — 99213 OFFICE O/P EST LOW 20 MIN: CPT

## 2023-10-20 RX ORDER — QUETIAPINE FUMARATE 25 MG/1
TABLET, FILM COATED ORAL
COMMUNITY
Start: 2023-10-03

## 2023-10-20 NOTE — PROGRESS NOTES
"Chief Complaint   Patient presents with    6 week f/u     Anxiety and depression  Stomach hurts with Diarrhea fever body aches since Wednesday, Dry mouth starts a cough.       Subjective      Evaristo Colvin is a 42 y.o. who presents for follow up on axiety and depression. States going well with current medications.   Diarrhea and nausea with fever x 2 days.  States that two other family members in the home are also ill but mostly with vomiting and without diarrhea.     The following portions of the patient's history were reviewed and updated as appropriate: allergies, current medications, past family history, past medical history, past social history, past surgical history, and problem list.    Review of Systems   Constitutional:  Positive for diaphoresis, fatigue and fever. Negative for chills.   Respiratory:  Negative for chest tightness and shortness of breath.    Cardiovascular:  Negative for chest pain.   Gastrointestinal:  Positive for abdominal pain (mild, lower abdominal cramping with diarrhea), diarrhea and nausea. Negative for blood in stool, constipation and vomiting.   Neurological:  Negative for dizziness.       Objective   Vital Signs:  /74   Pulse 109   Temp 97.1 °F (36.2 °C)   Resp 18   Ht 172.7 cm (68\")   Wt 98 kg (216 lb)   SpO2 95%   BMI 32.84 kg/m²               Physical Exam  Vitals and nursing note reviewed.   Constitutional:       Appearance: Normal appearance.   Cardiovascular:      Rate and Rhythm: Normal rate and regular rhythm.      Heart sounds: Normal heart sounds.   Abdominal:      General: Bowel sounds are increased. There is no distension.      Palpations: Abdomen is soft.      Tenderness: There is no abdominal tenderness.   Skin:     General: Skin is moist.   Neurological:      General: No focal deficit present.      Mental Status: He is alert and oriented to person, place, and time.   Psychiatric:         Mood and Affect: Mood normal.         Behavior: Behavior " normal.          Result Review                     Assessment and Plan  Diagnoses and all orders for this visit:    1. Viral gastroenteritis (Primary)  -     POCT SARS-CoV-2 Antigen FLYNN + Flu    2. Anxiety      Negative COVID in clinic today.  Recommend increased oral fluids and advised patient that viral gastroenteritis is self limited.  His nausea is mild and he does not feel he needs any medication for nausea. Do not recommend anti-diarrheal at this time.   Recommend patient follow up if no improvement or worsening. Discussed signs of dehydration to report.   Anxiety - continue current treatment with outside provider. Follow up as needed        Discussed medications prescribed and OTC medications recommended.  Discussed medication safety, possible side effects and how to take or administer medications. Instructed patient to report any adverse reactions, side effects or concerns.     Reviewed physical exam findings and plan with patient who verbalized understanding and agrees with plan of care. Patient was given opportunity to ask questions and all concerns were addressed prior to the conclusion of today's visit.     Follow Up  Return in about 3 months (around 1/20/2024) for Recheck.  Patient was given instructions and counseling regarding his condition or for health maintenance advice. Please see specific information pulled into the AVS if appropriate.     Note to patient: The 21st Century Cures Act makes medical notes like these available to patients in the interest of transparency. However, be advised this is a medical document. It is intended as peer to peer communication. It is written in medical language and may contain abbreviations or verbiage that are unfamiliar. It may appear blunt or direct. Medical documents are intended to carry relevant information, facts as evident, and the clinical opinion of the provider.

## 2023-10-23 DIAGNOSIS — M77.01 MEDIAL EPICONDYLITIS OF RIGHT ELBOW: ICD-10-CM

## 2023-11-16 DIAGNOSIS — M54.42 CHRONIC BILATERAL LOW BACK PAIN WITH LEFT-SIDED SCIATICA: ICD-10-CM

## 2023-11-16 DIAGNOSIS — G89.29 CHRONIC BILATERAL LOW BACK PAIN WITH LEFT-SIDED SCIATICA: ICD-10-CM

## 2023-11-16 RX ORDER — CYCLOBENZAPRINE HCL 10 MG
TABLET ORAL
Qty: 90 TABLET | Refills: 0 | Status: SHIPPED | OUTPATIENT
Start: 2023-11-16

## 2023-11-21 DIAGNOSIS — M77.01 MEDIAL EPICONDYLITIS OF RIGHT ELBOW: ICD-10-CM

## 2023-12-18 DIAGNOSIS — M54.42 CHRONIC BILATERAL LOW BACK PAIN WITH LEFT-SIDED SCIATICA: ICD-10-CM

## 2023-12-18 DIAGNOSIS — G89.29 CHRONIC BILATERAL LOW BACK PAIN WITH LEFT-SIDED SCIATICA: ICD-10-CM

## 2023-12-18 RX ORDER — CYCLOBENZAPRINE HCL 10 MG
10 TABLET ORAL 2 TIMES DAILY PRN
Qty: 90 TABLET | Refills: 0 | Status: SHIPPED | OUTPATIENT
Start: 2023-12-18

## 2023-12-18 NOTE — TELEPHONE ENCOUNTER
Caller: Evaristo Colvni    Relationship: Self    Best call back number:     Requested Prescriptions:   Requested Prescriptions     Pending Prescriptions Disp Refills    cyclobenzaprine (FLEXERIL) 10 MG tablet 90 tablet 0     Sig: Take 1 tablet by mouth 2 (Two) Times a Day As Needed for Muscle Spasms.        Pharmacy where request should be sent: [facility]/PHARMACY #[id number] - [address] AT [place] [address] - [phone] [facility] [phone] FX     Last office visit with prescribing clinician: 10/20/2023   Last telemedicine visit with prescribing clinician: Visit date not found   Next office visit with prescribing clinician: 1/23/2024     Additional details provided by patient: HAS A FEW DAYS LEFT     Does the patient have less than a 3 day supply:  [x] Yes  [] No    Would you like a call back once the refill request has been completed: [x] Yes [] No    If the office needs to give you a call back, can they leave a voicemail: [x] Yes [] No    Alejandra Dorado Rep   12/18/23 14:45 EST

## 2024-01-23 ENCOUNTER — OFFICE VISIT (OUTPATIENT)
Dept: FAMILY MEDICINE CLINIC | Facility: CLINIC | Age: 44
End: 2024-01-23
Payer: COMMERCIAL

## 2024-01-23 VITALS
TEMPERATURE: 98.4 F | HEART RATE: 102 BPM | SYSTOLIC BLOOD PRESSURE: 128 MMHG | DIASTOLIC BLOOD PRESSURE: 82 MMHG | HEIGHT: 68 IN | WEIGHT: 239 LBS | OXYGEN SATURATION: 96 % | RESPIRATION RATE: 18 BRPM | BODY MASS INDEX: 36.22 KG/M2

## 2024-01-23 DIAGNOSIS — E66.9 OBESITY (BMI 35.0-39.9 WITHOUT COMORBIDITY): ICD-10-CM

## 2024-01-23 DIAGNOSIS — F17.210 CIGARETTE NICOTINE DEPENDENCE WITHOUT COMPLICATION: Primary | ICD-10-CM

## 2024-01-23 DIAGNOSIS — N52.9 ERECTILE DYSFUNCTION, UNSPECIFIED ERECTILE DYSFUNCTION TYPE: ICD-10-CM

## 2024-01-23 DIAGNOSIS — H69.93 DYSFUNCTION OF BOTH EUSTACHIAN TUBES: ICD-10-CM

## 2024-01-23 DIAGNOSIS — M79.672 PAIN OF LEFT HEEL: ICD-10-CM

## 2024-01-23 DIAGNOSIS — M54.42 CHRONIC BILATERAL LOW BACK PAIN WITH LEFT-SIDED SCIATICA: ICD-10-CM

## 2024-01-23 DIAGNOSIS — G89.29 CHRONIC BILATERAL LOW BACK PAIN WITH LEFT-SIDED SCIATICA: ICD-10-CM

## 2024-01-23 PROCEDURE — 99214 OFFICE O/P EST MOD 30 MIN: CPT

## 2024-01-23 RX ORDER — NICOTINE 21 MG/24HR
1 PATCH, TRANSDERMAL 24 HOURS TRANSDERMAL EVERY 24 HOURS
Qty: 30 EACH | Refills: 1 | Status: SHIPPED | OUTPATIENT
Start: 2024-01-23

## 2024-01-23 RX ORDER — SILDENAFIL 50 MG/1
50 TABLET, FILM COATED ORAL DAILY PRN
Qty: 12 TABLET | Refills: 0 | Status: SHIPPED | OUTPATIENT
Start: 2024-01-23

## 2024-01-23 RX ORDER — CYCLOBENZAPRINE HCL 10 MG
10 TABLET ORAL 2 TIMES DAILY PRN
Qty: 60 TABLET | Refills: 2 | Status: SHIPPED | OUTPATIENT
Start: 2024-01-23

## 2024-01-23 RX ORDER — FLUTICASONE PROPIONATE 50 MCG
2 SPRAY, SUSPENSION (ML) NASAL DAILY
Qty: 18.2 ML | Refills: 0 | Status: SHIPPED | OUTPATIENT
Start: 2024-01-23

## 2024-01-23 NOTE — PROGRESS NOTES
"Chief Complaint   Patient presents with    3 month f/u     Medication review/ refill  Cialis not working, can he change to something else.  Scheduled for elbow surgery feb. 23rd       Subjective      Evaristo Colvin is a 43 y.o. who presents for follow up on medications.  States cialis is not working well. He would like to try a different medication.  Admits some weight gain.  Feels irritable. States he has had a lot of stress with moving and work.  Has gained about 24 lbs since last visit.  States he has been walking more and eating more salads but continues to gain weight. Has cut back on smoking but he ran out of patches and is back to smoking a pack per day. Would like more patches.    Currently having decreased hearing and some discharge left ear. Had an earache last week but the pain has improved.   Left heel pain since falling out of bed onto left heel about three days ago.      The following portions of the patient's history were reviewed and updated as appropriate: allergies, current medications, past family history, past medical history, past social history, past surgical history, and problem list.    Review of Systems   Constitutional:  Negative for chills and fever.   Respiratory:  Negative for chest tightness and shortness of breath.    Cardiovascular:  Negative for chest pain.   Genitourinary:  Positive for erectile dysfunction.       Objective   Vital Signs:  /82   Pulse 102   Temp 98.4 °F (36.9 °C)   Resp 18   Ht 172.7 cm (68\")   Wt 108 kg (239 lb)   SpO2 96%   BMI 36.34 kg/m²               Physical Exam  Vitals and nursing note reviewed.   Constitutional:       Appearance: Normal appearance.   HENT:      Right Ear: A middle ear effusion (clear) is present.      Left Ear: A middle ear effusion (clear) is present.   Cardiovascular:      Rate and Rhythm: Normal rate and regular rhythm.      Heart sounds: Normal heart sounds.   Pulmonary:      Breath sounds: Normal breath sounds. "   Neurological:      General: No focal deficit present.      Mental Status: He is alert and oriented to person, place, and time.   Psychiatric:         Mood and Affect: Mood normal.         Behavior: Behavior normal.          Result Review                     Assessment and Plan  Diagnoses and all orders for this visit:    1. Cigarette nicotine dependence without complication (Primary)  -     nicotine (NICODERM CQ) 21 MG/24HR patch; Place 1 patch on the skin as directed by provider Daily.  Dispense: 30 each; Refill: 1    2. Erectile dysfunction, unspecified erectile dysfunction type  -     sildenafil (Viagra) 50 MG tablet; Take 1 tablet by mouth Daily As Needed for Erectile Dysfunction.  Dispense: 12 tablet; Refill: 0  -     Ambulatory Referral to Urology    3. Obesity (BMI 35.0-39.9 without comorbidity)  -     Ambulatory Referral to Nutrition Services    4. Dysfunction of both eustachian tubes  -     fluticasone (FLONASE) 50 MCG/ACT nasal spray; 2 sprays into the nostril(s) as directed by provider Daily.  Dispense: 18.2 mL; Refill: 0    5. Chronic bilateral low back pain with left-sided sciatica  -     cyclobenzaprine (FLEXERIL) 10 MG tablet; Take 1 tablet by mouth 2 (Two) Times a Day As Needed for Muscle Spasms.  Dispense: 60 tablet; Refill: 2    6. Pain of left heel      Nicotine replacement as prescribed - instructed to call for refills.  Advised not to smoke while wearing patches.   ED - switch to viagra from cialis.  Consider urology referral if no improvement.   Referral to nutritionist for Obesity.   Flonase for eustachian tube dysfunction  Lower back pain (chronic) refill of flexeril sent to pharmacy  Consider xray of left heel if no improvement in a week (Posterior heel pain)  Follow up in 6 weeks to recheck obestiy, tobacco cessation and heel pain.           Discussed medications prescribed and OTC medications recommended.  Discussed medication safety, possible side effects and how to take or administer  medications. Instructed patient to report any adverse reactions, side effects or concerns.     Reviewed physical exam findings and plan with patient who verbalized understanding and agrees with plan of care. Patient was given opportunity to ask questions and all concerns were addressed prior to the conclusion of today's visit.     Follow Up  Return in about 6 weeks (around 3/5/2024) for Recheck.  Patient was given instructions and counseling regarding his condition or for health maintenance advice. Please see specific information pulled into the AVS if appropriate.     Note to patient: The 21st Century Cures Act makes medical notes like these available to patients in the interest of transparency. However, be advised this is a medical document. It is intended as peer to peer communication. It is written in medical language and may contain abbreviations or verbiage that are unfamiliar. It may appear blunt or direct. Medical documents are intended to carry relevant information, facts as evident, and the clinical opinion of the provider.

## 2024-01-30 ENCOUNTER — TELEPHONE (OUTPATIENT)
Dept: FAMILY MEDICINE CLINIC | Facility: CLINIC | Age: 44
End: 2024-01-30

## 2024-01-30 ENCOUNTER — TELEPHONE (OUTPATIENT)
Dept: UROLOGY | Facility: CLINIC | Age: 44
End: 2024-01-30
Payer: COMMERCIAL

## 2024-01-30 NOTE — TELEPHONE ENCOUNTER
CALLED PATIENT ABOUT MOVING UP APPT. BUT THERE IS NO AVAILABILITY  FOR THAT DAY, I TOLD HIM HE CAN CALL CLOSER TO THE APPT. DATE AND SEE IF THERE ARE ANY CANCELLATIONS.

## 2024-01-30 NOTE — TELEPHONE ENCOUNTER
Caller: Evaristo Colvin     Relationship: SELF    Best call back number: [phone]    What is the best time to reach you: ASAP    Who are you requesting to speak with (clinical staff, provider,  specific staff member): SCHEDULING     What was the call regarding: INCOMING CALL FROM PT ASKING IF THERE IS ANYWAY TO PUSH HIS APPT TIME UP DUE TO A CONFLICTING APPT AT 2:30. PT WOULD LIKE A CALL BACK TO SEE IF THIS IS POSSIBLE.     HUB DID NOT HAVE ANYTHING AVAILABLE. OFFERED A DIFFERENT DAY HOWEVER PT WAS UNABLE TO ACCEPT THOSE DAYS

## 2024-01-30 NOTE — TELEPHONE ENCOUNTER
Hub staff attempted to follow warm transfer process and was unsuccessful     Caller: Evaristo Colvin    Relationship to patient: Self    Best call back number: [phone]    Patient is needing: PATIENT'S APPOINTMENT WITH [facility]RI[facility]ING WAS CANCELLED AT 4:58PM    AT 4:59PM THE PATIENT CALLED AND ASKED TO BE MOVED TO THE 12:45PM SLOT THAT WAS ADVISED TO BE AVAILABLE WITH ADRIANA TOMORROW 1/31/24, HOWEVER NO APPOINTMENTS WERE AVAILABLE TO SCHEDULE.    HE DISCONNECTED THE CALL WHEN ADVISED OF THIS INFORMATION.  
Patient

## 2024-01-31 ENCOUNTER — OFFICE VISIT (OUTPATIENT)
Dept: FAMILY MEDICINE CLINIC | Facility: CLINIC | Age: 44
End: 2024-01-31
Payer: COMMERCIAL

## 2024-01-31 VITALS
SYSTOLIC BLOOD PRESSURE: 146 MMHG | OXYGEN SATURATION: 98 % | TEMPERATURE: 98.2 F | HEART RATE: 90 BPM | WEIGHT: 235.4 LBS | BODY MASS INDEX: 35.68 KG/M2 | DIASTOLIC BLOOD PRESSURE: 94 MMHG | RESPIRATION RATE: 19 BRPM | HEIGHT: 68 IN

## 2024-01-31 DIAGNOSIS — K21.9 GASTROESOPHAGEAL REFLUX DISEASE, UNSPECIFIED WHETHER ESOPHAGITIS PRESENT: ICD-10-CM

## 2024-01-31 DIAGNOSIS — H66.013 ACUTE SUPPURATIVE OTITIS MEDIA OF BOTH EARS WITH SPONTANEOUS RUPTURE OF TYMPANIC MEMBRANES, RECURRENCE NOT SPECIFIED: Primary | ICD-10-CM

## 2024-01-31 PROCEDURE — 99213 OFFICE O/P EST LOW 20 MIN: CPT | Performed by: PHYSICIAN ASSISTANT

## 2024-01-31 RX ORDER — SODIUM FLUORIDE 5 MG/ML
1 PASTE, DENTIFRICE DENTAL DAILY
COMMUNITY
Start: 2023-09-16

## 2024-01-31 RX ORDER — CIPROFLOXACIN AND DEXAMETHASONE 3; 1 MG/ML; MG/ML
4 SUSPENSION/ DROPS AURICULAR (OTIC) 2 TIMES DAILY
Qty: 7.5 ML | Refills: 0 | Status: SHIPPED | OUTPATIENT
Start: 2024-01-31 | End: 2024-02-07

## 2024-01-31 RX ORDER — AMOXICILLIN 500 MG/1
1000 CAPSULE ORAL 2 TIMES DAILY
Qty: 40 CAPSULE | Refills: 0 | Status: SHIPPED | OUTPATIENT
Start: 2024-01-31

## 2024-01-31 RX ORDER — OMEPRAZOLE 20 MG/1
CAPSULE, DELAYED RELEASE ORAL
Qty: 30 CAPSULE | Refills: 3 | Status: SHIPPED | OUTPATIENT
Start: 2024-01-31

## 2024-01-31 RX ORDER — PREDNISONE 10 MG/1
TABLET ORAL
Qty: 21 EACH | Refills: 0 | Status: SHIPPED | OUTPATIENT
Start: 2024-01-31

## 2024-01-31 NOTE — PROGRESS NOTES
"Subjective   Evaristo Colvin is a 43 y.o. male.     History of Present Illness   F/u for bilateral ear pain and drainage from both ears.   Was seen last week and diagnosed with ETD and prescribed flonase  Patient notes recurrent issues with ears in the past and states he had to have \"needles in his ears\" in the past to relieve pressure. Thinks he has ruptured eardrums in the past   Ears are very sensitive and can not hear well from either ear   Has not seen ENT as an adult   Also having some sinus pressure and congestion     The following portions of the patient's history were reviewed and updated as appropriate: allergies, current medications, past family history, past medical history, past social history, past surgical history, and problem list.    Review of Systems  As noted per HPI     Objective   Blood pressure 146/94, pulse 90, temperature 98.2 °F (36.8 °C), resp. rate 19, height 172.7 cm (68\"), weight 107 kg (235 lb 6.4 oz), SpO2 98%.   Physical Exam  Vitals and nursing note reviewed.   Constitutional:       Appearance: He is well-developed.   HENT:      Head: Normocephalic and atraumatic.      Right Ear: Decreased hearing noted. Drainage and tenderness present.      Left Ear: External ear normal. Decreased hearing noted. No drainage. Tympanic membrane is erythematous.      Ears:      Comments: Purulent drainage of R ear canal. Can not see TM. No significant swelling of ear canal so suspect ruptured TM     Loss of bony landmarks of L TM . Suspect TM rupture      Nose: Nose normal.      Mouth/Throat:      Pharynx: No oropharyngeal exudate.   Eyes:      Conjunctiva/sclera: Conjunctivae normal.   Neck:      Thyroid: No thyromegaly.      Trachea: No tracheal deviation.   Cardiovascular:      Rate and Rhythm: Normal rate and regular rhythm.   Pulmonary:      Effort: Pulmonary effort is normal. No respiratory distress.      Breath sounds: Wheezing present. No rales.   Chest:      Chest wall: No tenderness. "   Musculoskeletal:      Cervical back: Normal range of motion and neck supple.   Lymphadenopathy:      Cervical: No cervical adenopathy.   Skin:     General: Skin is warm and dry.   Neurological:      Mental Status: He is alert and oriented to person, place, and time.   Psychiatric:         Mood and Affect: Mood normal.         Behavior: Behavior normal.         Thought Content: Thought content normal.         Judgment: Judgment normal.         Assessment & Plan   Diagnoses and all orders for this visit:    1. Acute suppurative otitis media of both ears with spontaneous rupture of tympanic membranes, recurrence not specified (Primary)  -     amoxicillin (AMOXIL) 500 MG capsule; Take 2 capsules by mouth 2 (Two) Times a Day.  Dispense: 40 capsule; Refill: 0  -     predniSONE (DELTASONE) 10 MG (21) dose pack; Use as directed on package  Dispense: 21 each; Refill: 0  -     ciprofloxacin-dexAMETHasone (Ciprodex) 0.3-0.1 % otic suspension; Administer 4 drops into both ears 2 (Two) Times a Day for 7 days.  Dispense: 7.5 mL; Refill: 0  -     Ambulatory Referral to ENT (Otolaryngology)    Begin treatment as outlined in plan and will put urgent referral to ENT

## 2024-02-01 ENCOUNTER — TELEPHONE (OUTPATIENT)
Dept: FAMILY MEDICINE CLINIC | Facility: CLINIC | Age: 44
End: 2024-02-01
Payer: COMMERCIAL

## 2024-02-01 NOTE — TELEPHONE ENCOUNTER
Kaylyn for work note. Please send to his mychart. He can also see his office visit note in [facility]

## 2024-02-01 NOTE — TELEPHONE ENCOUNTER
PATIENT HAS CALLED AND STATED HE WAS SEEN BY JACINTO ALFONSO ON 01/31/24. PATIENT IS REQUESTING A WORK EXCUSE EXCUSING HIM FROM WORK 01/31/2024 THROUGH FRIDAY 02/02/2024. PATIENT IS ALSO REQUESTING OFFICE VISIT NOTE. PATIENT WANTS THE EXCUSE AND VISIT NOTE DETAILING HIS EAR PROBLEM SENT TO HIS MYCHART.    CALL BACK NUMBER [phone]

## 2024-02-05 ENCOUNTER — TELEPHONE (OUTPATIENT)
Dept: FAMILY MEDICINE CLINIC | Facility: CLINIC | Age: 44
End: 2024-02-05
Payer: COMMERCIAL

## 2024-02-05 DIAGNOSIS — M79.672 PAIN OF LEFT HEEL: Primary | ICD-10-CM

## 2024-02-09 ENCOUNTER — PRE-ADMISSION TESTING (OUTPATIENT)
Dept: PREADMISSION TESTING | Facility: HOSPITAL | Age: 44
End: 2024-02-09
Payer: COMMERCIAL

## 2024-02-09 ENCOUNTER — HOSPITAL ENCOUNTER (OUTPATIENT)
Dept: GENERAL RADIOLOGY | Facility: HOSPITAL | Age: 44
Discharge: HOME | End: 2024-02-09
Payer: COMMERCIAL

## 2024-02-09 VITALS — HEIGHT: 68 IN | WEIGHT: 233.14 LBS | BODY MASS INDEX: 35.33 KG/M2

## 2024-02-09 LAB
ALBUMIN SERPL-MCNC: 4.7 G/DL (ref 3.5–5.2)
ALBUMIN/GLOB SERPL: 1.6 G/DL
ALP SERPL-CCNC: 133 U/L (ref 39–117)
ALT SERPL W P-5'-P-CCNC: 110 U/L (ref 1–41)
ANION GAP SERPL CALCULATED.3IONS-SCNC: 10 MMOL/L (ref 5–15)
APTT PPP: 28.6 SECONDS (ref 22–39)
AST SERPL-CCNC: 72 U/L (ref 1–40)
BASOPHILS # BLD AUTO: 0.05 10*3/MM3 (ref 0–0.2)
BASOPHILS NFR BLD AUTO: 0.6 % (ref 0–1.5)
BILIRUB SERPL-MCNC: 0.4 MG/DL (ref 0–1.2)
BUN SERPL-MCNC: 20 MG/DL (ref 6–20)
BUN/CREAT SERPL: 20.2 (ref 7–25)
CALCIUM SPEC-SCNC: 9.4 MG/DL (ref 8.6–10.5)
CHLORIDE SERPL-SCNC: 103 MMOL/L (ref 98–107)
CO2 SERPL-SCNC: 26 MMOL/L (ref 22–29)
CREAT SERPL-MCNC: 0.99 MG/DL (ref 0.76–1.27)
DEPRECATED RDW RBC AUTO: 43.2 FL (ref 37–54)
EGFRCR SERPLBLD CKD-EPI 2021: 96.3 ML/MIN/1.73
EOSINOPHIL # BLD AUTO: 0.23 10*3/MM3 (ref 0–0.4)
EOSINOPHIL NFR BLD AUTO: 2.7 % (ref 0.3–6.2)
ERYTHROCYTE [DISTWIDTH] IN BLOOD BY AUTOMATED COUNT: 13.3 % (ref 12.3–15.4)
GLOBULIN UR ELPH-MCNC: 2.9 GM/DL
GLUCOSE SERPL-MCNC: 111 MG/DL (ref 65–99)
HBA1C MFR BLD: 6.3 % (ref 4.8–5.6)
HCT VFR BLD AUTO: 47.3 % (ref 37.5–51)
HGB BLD-MCNC: 15.6 G/DL (ref 13–17.7)
IMM GRANULOCYTES # BLD AUTO: 0.08 10*3/MM3 (ref 0–0.05)
IMM GRANULOCYTES NFR BLD AUTO: 0.9 % (ref 0–0.5)
INR PPP: 0.99 (ref 0.89–1.12)
LYMPHOCYTES # BLD AUTO: 3.33 10*3/MM3 (ref 0.7–3.1)
LYMPHOCYTES NFR BLD AUTO: 39.4 % (ref 19.6–45.3)
MCH RBC QN AUTO: 29.4 PG (ref 26.6–33)
MCHC RBC AUTO-ENTMCNC: 33 G/DL (ref 31.5–35.7)
MCV RBC AUTO: 89.1 FL (ref 79–97)
MONOCYTES # BLD AUTO: 0.49 10*3/MM3 (ref 0.1–0.9)
MONOCYTES NFR BLD AUTO: 5.8 % (ref 5–12)
NEUTROPHILS NFR BLD AUTO: 4.27 10*3/MM3 (ref 1.7–7)
NEUTROPHILS NFR BLD AUTO: 50.6 % (ref 42.7–76)
NRBC BLD AUTO-RTO: 0 /100 WBC (ref 0–0.2)
PLATELET # BLD AUTO: 274 10*3/MM3 (ref 140–450)
PMV BLD AUTO: 9.1 FL (ref 6–12)
POTASSIUM SERPL-SCNC: 4.5 MMOL/L (ref 3.5–5.2)
PROT SERPL-MCNC: 7.6 G/DL (ref 6–8.5)
PROTHROMBIN TIME: 13.2 SECONDS (ref 12.2–14.5)
RBC # BLD AUTO: 5.31 10*6/MM3 (ref 4.14–5.8)
SODIUM SERPL-SCNC: 139 MMOL/L (ref 136–145)
WBC NRBC COR # BLD AUTO: 8.45 10*3/MM3 (ref 3.4–10.8)

## 2024-02-09 PROCEDURE — 80053 COMPREHEN METABOLIC PANEL: CPT

## 2024-02-09 PROCEDURE — 85730 THROMBOPLASTIN TIME PARTIAL: CPT

## 2024-02-09 PROCEDURE — 85610 PROTHROMBIN TIME: CPT

## 2024-02-09 PROCEDURE — 93010 ELECTROCARDIOGRAM REPORT: CPT | Performed by: INTERNAL MEDICINE

## 2024-02-09 PROCEDURE — 93005 ELECTROCARDIOGRAM TRACING: CPT

## 2024-02-09 PROCEDURE — 83036 HEMOGLOBIN GLYCOSYLATED A1C: CPT

## 2024-02-09 PROCEDURE — 71046 X-RAY EXAM CHEST 2 VIEWS: CPT

## 2024-02-09 PROCEDURE — 85025 COMPLETE CBC W/AUTO DIFF WBC: CPT

## 2024-02-09 PROCEDURE — 36415 COLL VENOUS BLD VENIPUNCTURE: CPT

## 2024-02-09 NOTE — PAT
Patient viewed general PAT education video as instructed in their preoperative information received from their surgeon.  Patient stated the general PAT education video was viewed in its entirety and survey completed.  Copies of [facility] general education handouts (Incentive Spirometry, Meds to Beds Program, Patient Belongings, Pre-op skin preparation instructions, Blood Glucose testing, Visitor policy, Surgery FAQ, Code H) distributed to patient if not printed. Education related to the PAT pass and skin preparation for surgery (if applicable) completed in PAT as a reinforcement to PAT education video. Patient instructed to return PAT pass provided today as well as completed skin preparation sheet (if applicable) on the day of procedure.     Additionally if patient had not viewed video yet but intended to view it at home or in our waiting area, then referred them to the handout with QR code/link provided during PAT visit.  Instructed patient to complete survey after viewing the video in its entirety.  Encouraged patient/family to read [facility] general education handouts thoroughly and notify PAT staff with any questions or concerns. Patient verbalized understanding of all information and priority content.    An arrival time for procedure was not provided during PAT visit. If patient had any questions or concerns about their arrival time, they were instructed to contact their surgeon/physician.  Additionally, if the patient referred to an arrival time that was acquired from their my chart account, patient was encouraged to verify that time with their surgeon/physician. Arrival times are NOT provided in Pre Admission Testing Department.    Patient denies any current skin issues.     Patient to apply Chlorhexadine wipes  to surgical area (as instructed) the night before procedure and the AM of procedure. Wipes provided.    Patient instructed to drink 20 ounces of Gatorade or Gatorlyte (if diabetic) and it needs to be completed 1  hour (for Main OR patients) or 2 hours (scheduled  section & [facility] patients) before given arrival time for procedure (NO RED Gatorade and NO Gatorade Zero).    Patient verbalized understanding.    Patient directed to Radiology Department for CXR after Pre Admission Testing Appointment.

## 2024-02-09 NOTE — PAT
Pt reported taking suboxone 8-2mg daily, per dr lyndon lewis to continue taking and to take as prescribed

## 2024-02-10 LAB
QT INTERVAL: 350 MS
QTC INTERVAL: 418 MS

## 2024-02-23 ENCOUNTER — ANESTHESIA EVENT CONVERTED (OUTPATIENT)
Dept: ANESTHESIOLOGY | Facility: HOSPITAL | Age: 44
End: 2024-02-23
Payer: COMMERCIAL

## 2024-02-23 ENCOUNTER — ANESTHESIA EVENT (OUTPATIENT)
Dept: PERIOP | Facility: HOSPITAL | Age: 44
End: 2024-02-23
Payer: COMMERCIAL

## 2024-02-23 ENCOUNTER — HOSPITAL ENCOUNTER (OUTPATIENT)
Facility: HOSPITAL | Age: 44
Setting detail: HOSPITAL OUTPATIENT SURGERY
Discharge: HOME | End: 2024-02-23
Attending: ORTHOPAEDIC SURGERY | Admitting: ORTHOPAEDIC SURGERY
Payer: COMMERCIAL

## 2024-02-23 ENCOUNTER — ANESTHESIA (OUTPATIENT)
Dept: PERIOP | Facility: HOSPITAL | Age: 44
End: 2024-02-23
Payer: COMMERCIAL

## 2024-02-23 VITALS
WEIGHT: 233.14 LBS | OXYGEN SATURATION: 92 % | DIASTOLIC BLOOD PRESSURE: 91 MMHG | SYSTOLIC BLOOD PRESSURE: 136 MMHG | HEART RATE: 82 BPM | BODY MASS INDEX: 35.33 KG/M2 | RESPIRATION RATE: 16 BRPM | TEMPERATURE: 97.6 F | HEIGHT: 68 IN

## 2024-02-23 PROCEDURE — 25010000002 HYDROMORPHONE 1 MG/ML SOLUTION

## 2024-02-23 PROCEDURE — 25010000002 BUPIVACAINE (PF) 0.25 % SOLUTION: Performed by: NURSE ANESTHETIST, CERTIFIED REGISTERED

## 2024-02-23 PROCEDURE — 25010000002 PROPOFOL 10 MG/ML EMULSION: Performed by: ANESTHESIOLOGY

## 2024-02-23 PROCEDURE — 25010000002 CEFAZOLIN PER 500 MG: Performed by: ORTHOPAEDIC SURGERY

## 2024-02-23 PROCEDURE — 25810000003 LACTATED RINGERS PER 1000 ML: Performed by: ANESTHESIOLOGY

## 2024-02-23 PROCEDURE — 25010000002 DEXAMETHASONE PER 1 MG: Performed by: ANESTHESIOLOGY

## 2024-02-23 PROCEDURE — 25010000002 ESMOLOL 100 MG/10ML SOLUTION: Performed by: ANESTHESIOLOGY

## 2024-02-23 PROCEDURE — 25010000002 ONDANSETRON PER 1 MG: Performed by: ANESTHESIOLOGY

## 2024-02-23 PROCEDURE — 25010000002 VANCOMYCIN 1 G RECONSTITUTED SOLUTION: Performed by: ORTHOPAEDIC SURGERY

## 2024-02-23 PROCEDURE — 25010000002 LABETALOL 5 MG/ML SOLUTION: Performed by: ANESTHESIOLOGY

## 2024-02-23 PROCEDURE — C1713 ANCHOR/SCREW BN/BN,TIS/BN: HCPCS | Performed by: ORTHOPAEDIC SURGERY

## 2024-02-23 PROCEDURE — 25010000002 FENTANYL CITRATE (PF) 50 MCG/ML SOLUTION: Performed by: NURSE ANESTHETIST, CERTIFIED REGISTERED

## 2024-02-23 PROCEDURE — 25010000002 ROPIVACAINE HCL-NACL 0.2-0.9 % SOLUTION: Performed by: NURSE ANESTHETIST, CERTIFIED REGISTERED

## 2024-02-23 PROCEDURE — 25010000002 SUGAMMADEX 200 MG/2ML SOLUTION: Performed by: ANESTHESIOLOGY

## 2024-02-23 PROCEDURE — 25010000002 FENTANYL CITRATE (PF) 50 MCG/ML SOLUTION

## 2024-02-23 DEVICE — IMPLANTABLE DEVICE: Type: IMPLANTABLE DEVICE | Site: ELBOW | Status: FUNCTIONAL

## 2024-02-23 DEVICE — SYS SUT/ANCH ACHILLES SPEEDBRIDGE W/3.9MM/SWIVELOCK/ANCHR: Type: IMPLANTABLE DEVICE | Site: ELBOW | Status: FUNCTIONAL

## 2024-02-23 DEVICE — KT INST KN FIBERTAK/DRILLGUIDE DISP: Type: IMPLANTABLE DEVICE | Site: ELBOW | Status: FUNCTIONAL

## 2024-02-23 RX ORDER — ONDANSETRON 2 MG/ML
4 INJECTION INTRAMUSCULAR; INTRAVENOUS ONCE AS NEEDED
Status: DISCONTINUED | OUTPATIENT
Start: 2024-02-23 | End: 2024-02-23 | Stop reason: HOSPADM

## 2024-02-23 RX ORDER — SODIUM CHLORIDE 9 MG/ML
40 INJECTION, SOLUTION INTRAVENOUS AS NEEDED
Status: DISCONTINUED | OUTPATIENT
Start: 2024-02-23 | End: 2024-02-23 | Stop reason: HOSPADM

## 2024-02-23 RX ORDER — TRANEXAMIC ACID 10 MG/ML
1000 INJECTION, SOLUTION INTRAVENOUS ONCE
Qty: 100 ML | Refills: 0 | Status: COMPLETED | OUTPATIENT
Start: 2024-02-23 | End: 2024-02-23

## 2024-02-23 RX ORDER — PREGABALIN 75 MG/1
75 CAPSULE ORAL ONCE
Status: COMPLETED | OUTPATIENT
Start: 2024-02-23 | End: 2024-02-23

## 2024-02-23 RX ORDER — LIDOCAINE HYDROCHLORIDE 10 MG/ML
INJECTION, SOLUTION EPIDURAL; INFILTRATION; INTRACAUDAL; PERINEURAL AS NEEDED
Status: DISCONTINUED | OUTPATIENT
Start: 2024-02-23 | End: 2024-02-23 | Stop reason: SURG

## 2024-02-23 RX ORDER — VANCOMYCIN HYDROCHLORIDE 1 G/20ML
INJECTION, POWDER, LYOPHILIZED, FOR SOLUTION INTRAVENOUS AS NEEDED
Status: DISCONTINUED | OUTPATIENT
Start: 2024-02-23 | End: 2024-02-23 | Stop reason: HOSPADM

## 2024-02-23 RX ORDER — DEXAMETHASONE SODIUM PHOSPHATE 4 MG/ML
INJECTION, SOLUTION INTRA-ARTICULAR; INTRALESIONAL; INTRAMUSCULAR; INTRAVENOUS; SOFT TISSUE AS NEEDED
Status: DISCONTINUED | OUTPATIENT
Start: 2024-02-23 | End: 2024-02-23 | Stop reason: SURG

## 2024-02-23 RX ORDER — MAGNESIUM HYDROXIDE 1200 MG/15ML
LIQUID ORAL AS NEEDED
Status: DISCONTINUED | OUTPATIENT
Start: 2024-02-23 | End: 2024-02-23 | Stop reason: HOSPADM

## 2024-02-23 RX ORDER — LIDOCAINE HYDROCHLORIDE 10 MG/ML
0.5 INJECTION, SOLUTION EPIDURAL; INFILTRATION; INTRACAUDAL; PERINEURAL ONCE AS NEEDED
Status: DISCONTINUED | OUTPATIENT
Start: 2024-02-23 | End: 2024-02-23 | Stop reason: HOSPADM

## 2024-02-23 RX ORDER — FENTANYL CITRATE 50 UG/ML
INJECTION, SOLUTION INTRAMUSCULAR; INTRAVENOUS
Status: COMPLETED
Start: 2024-02-23 | End: 2024-02-23

## 2024-02-23 RX ORDER — OXYCODONE HYDROCHLORIDE 5 MG/1
5 TABLET ORAL EVERY 6 HOURS PRN
Qty: 30 TABLET | Refills: 0 | Status: SHIPPED | OUTPATIENT
Start: 2024-02-23

## 2024-02-23 RX ORDER — ACETAMINOPHEN 500 MG
1000 TABLET ORAL ONCE
Status: COMPLETED | OUTPATIENT
Start: 2024-02-23 | End: 2024-02-23

## 2024-02-23 RX ORDER — BUPIVACAINE HYDROCHLORIDE 2.5 MG/ML
INJECTION, SOLUTION EPIDURAL; INFILTRATION; INTRACAUDAL
Status: COMPLETED | OUTPATIENT
Start: 2024-02-23 | End: 2024-02-23

## 2024-02-23 RX ORDER — ROPIVACAINE HYDROCHLORIDE 2 MG/ML
INJECTION, SOLUTION EPIDURAL; INFILTRATION; PERINEURAL CONTINUOUS
Status: DISCONTINUED | OUTPATIENT
Start: 2024-02-23 | End: 2024-02-23 | Stop reason: HOSPADM

## 2024-02-23 RX ORDER — HYDROMORPHONE HYDROCHLORIDE 1 MG/ML
0.5 INJECTION, SOLUTION INTRAMUSCULAR; INTRAVENOUS; SUBCUTANEOUS
Status: COMPLETED | OUTPATIENT
Start: 2024-02-23 | End: 2024-02-23

## 2024-02-23 RX ORDER — ONDANSETRON 4 MG/1
4 TABLET, FILM COATED ORAL EVERY 8 HOURS PRN
Qty: 30 TABLET | Refills: 0 | Status: SHIPPED | OUTPATIENT
Start: 2024-02-23

## 2024-02-23 RX ORDER — FAMOTIDINE 10 MG/ML
20 INJECTION, SOLUTION INTRAVENOUS ONCE
Status: CANCELLED | OUTPATIENT
Start: 2024-02-23 | End: 2024-02-23

## 2024-02-23 RX ORDER — SODIUM CHLORIDE 0.9 % (FLUSH) 0.9 %
10 SYRINGE (ML) INJECTION AS NEEDED
Status: DISCONTINUED | OUTPATIENT
Start: 2024-02-23 | End: 2024-02-23 | Stop reason: HOSPADM

## 2024-02-23 RX ORDER — FENTANYL CITRATE 50 UG/ML
50 INJECTION, SOLUTION INTRAMUSCULAR; INTRAVENOUS
Status: DISCONTINUED | OUTPATIENT
Start: 2024-02-23 | End: 2024-02-23 | Stop reason: HOSPADM

## 2024-02-23 RX ORDER — LABETALOL HYDROCHLORIDE 5 MG/ML
INJECTION, SOLUTION INTRAVENOUS AS NEEDED
Status: DISCONTINUED | OUTPATIENT
Start: 2024-02-23 | End: 2024-02-23 | Stop reason: SURG

## 2024-02-23 RX ORDER — ROCURONIUM BROMIDE 10 MG/ML
INJECTION, SOLUTION INTRAVENOUS AS NEEDED
Status: DISCONTINUED | OUTPATIENT
Start: 2024-02-23 | End: 2024-02-23 | Stop reason: SURG

## 2024-02-23 RX ORDER — OXYCODONE HYDROCHLORIDE 5 MG/1
5 TABLET ORAL ONCE
Status: COMPLETED | OUTPATIENT
Start: 2024-02-23 | End: 2024-02-23

## 2024-02-23 RX ORDER — ESMOLOL HYDROCHLORIDE 10 MG/ML
INJECTION INTRAVENOUS AS NEEDED
Status: DISCONTINUED | OUTPATIENT
Start: 2024-02-23 | End: 2024-02-23 | Stop reason: SURG

## 2024-02-23 RX ORDER — ONDANSETRON 2 MG/ML
INJECTION INTRAMUSCULAR; INTRAVENOUS AS NEEDED
Status: DISCONTINUED | OUTPATIENT
Start: 2024-02-23 | End: 2024-02-23 | Stop reason: SURG

## 2024-02-23 RX ORDER — SODIUM CHLORIDE 0.9 % (FLUSH) 0.9 %
10 SYRINGE (ML) INJECTION EVERY 12 HOURS SCHEDULED
Status: DISCONTINUED | OUTPATIENT
Start: 2024-02-23 | End: 2024-02-23 | Stop reason: HOSPADM

## 2024-02-23 RX ORDER — OXYCODONE HYDROCHLORIDE 5 MG/1
TABLET ORAL
Status: COMPLETED
Start: 2024-02-23 | End: 2024-02-23

## 2024-02-23 RX ORDER — MIDAZOLAM HYDROCHLORIDE 1 MG/ML
1 INJECTION INTRAMUSCULAR; INTRAVENOUS
Status: DISCONTINUED | OUTPATIENT
Start: 2024-02-23 | End: 2024-02-23 | Stop reason: HOSPADM

## 2024-02-23 RX ORDER — SODIUM CHLORIDE, SODIUM LACTATE, POTASSIUM CHLORIDE, CALCIUM CHLORIDE 600; 310; 30; 20 MG/100ML; MG/100ML; MG/100ML; MG/100ML
9 INJECTION, SOLUTION INTRAVENOUS CONTINUOUS
Status: DISCONTINUED | OUTPATIENT
Start: 2024-02-23 | End: 2024-02-23 | Stop reason: HOSPADM

## 2024-02-23 RX ORDER — PROPOFOL 10 MG/ML
VIAL (ML) INTRAVENOUS AS NEEDED
Status: DISCONTINUED | OUTPATIENT
Start: 2024-02-23 | End: 2024-02-23 | Stop reason: SURG

## 2024-02-23 RX ORDER — FAMOTIDINE 20 MG/1
20 TABLET, FILM COATED ORAL ONCE
Status: COMPLETED | OUTPATIENT
Start: 2024-02-23 | End: 2024-02-23

## 2024-02-23 RX ORDER — FENTANYL CITRATE 50 UG/ML
INJECTION, SOLUTION INTRAMUSCULAR; INTRAVENOUS
Status: COMPLETED | OUTPATIENT
Start: 2024-02-23 | End: 2024-02-23

## 2024-02-23 RX ADMIN — ESMOLOL HYDROCHLORIDE 50 MG: 10 INJECTION, SOLUTION INTRAVENOUS at 14:59

## 2024-02-23 RX ADMIN — OXYCODONE HYDROCHLORIDE 5 MG: 5 TABLET ORAL at 17:13

## 2024-02-23 RX ADMIN — SODIUM CHLORIDE 2000 MG: 900 INJECTION INTRAVENOUS at 15:03

## 2024-02-23 RX ADMIN — HYDROMORPHONE HYDROCHLORIDE 0.5 MG: 1 INJECTION, SOLUTION INTRAMUSCULAR; INTRAVENOUS; SUBCUTANEOUS at 17:48

## 2024-02-23 RX ADMIN — FENTANYL CITRATE 50 MCG: 50 INJECTION, SOLUTION INTRAMUSCULAR; INTRAVENOUS at 17:27

## 2024-02-23 RX ADMIN — HYDROMORPHONE HYDROCHLORIDE 0.5 MG: 1 INJECTION, SOLUTION INTRAMUSCULAR; INTRAVENOUS; SUBCUTANEOUS at 17:44

## 2024-02-23 RX ADMIN — TRANEXAMIC ACID 1000 MG: 10 INJECTION, SOLUTION INTRAVENOUS at 15:13

## 2024-02-23 RX ADMIN — LABETALOL HYDROCHLORIDE 10 MG: 5 INJECTION, SOLUTION INTRAVENOUS at 15:48

## 2024-02-23 RX ADMIN — LIDOCAINE HYDROCHLORIDE 50 MG: 10 INJECTION, SOLUTION EPIDURAL; INFILTRATION; INTRACAUDAL; PERINEURAL at 14:59

## 2024-02-23 RX ADMIN — HYDROMORPHONE HYDROCHLORIDE 0.5 MG: 1 INJECTION, SOLUTION INTRAMUSCULAR; INTRAVENOUS; SUBCUTANEOUS at 17:09

## 2024-02-23 RX ADMIN — SODIUM CHLORIDE, POTASSIUM CHLORIDE, SODIUM LACTATE AND CALCIUM CHLORIDE 9 ML/HR: 600; 310; 30; 20 INJECTION, SOLUTION INTRAVENOUS at 12:39

## 2024-02-23 RX ADMIN — ACETAMINOPHEN 1000 MG: 500 TABLET ORAL at 12:39

## 2024-02-23 RX ADMIN — ROCURONIUM BROMIDE 50 MG: 10 INJECTION INTRAVENOUS at 14:59

## 2024-02-23 RX ADMIN — FENTANYL CITRATE 50 MCG: 50 INJECTION, SOLUTION INTRAMUSCULAR; INTRAVENOUS at 17:02

## 2024-02-23 RX ADMIN — HYDROMORPHONE HYDROCHLORIDE 0.5 MG: 1 INJECTION, SOLUTION INTRAMUSCULAR; INTRAVENOUS; SUBCUTANEOUS at 18:22

## 2024-02-23 RX ADMIN — FENTANYL CITRATE 50 MCG: 50 INJECTION, SOLUTION INTRAMUSCULAR; INTRAVENOUS at 18:07

## 2024-02-23 RX ADMIN — SUGAMMADEX 200 MG: 100 INJECTION, SOLUTION INTRAVENOUS at 16:35

## 2024-02-23 RX ADMIN — FENTANYL CITRATE 100 MCG: 50 INJECTION, SOLUTION INTRAMUSCULAR; INTRAVENOUS at 12:56

## 2024-02-23 RX ADMIN — ONDANSETRON 4 MG: 2 INJECTION INTRAMUSCULAR; INTRAVENOUS at 16:33

## 2024-02-23 RX ADMIN — TRANEXAMIC ACID 1000 MG: 10 INJECTION, SOLUTION INTRAVENOUS at 16:02

## 2024-02-23 RX ADMIN — BUPIVACAINE HYDROCHLORIDE 30 ML: 2.5 INJECTION, SOLUTION EPIDURAL; INFILTRATION; INTRACAUDAL; PERINEURAL at 13:05

## 2024-02-23 RX ADMIN — Medication 1000 MG: at 16:54

## 2024-02-23 RX ADMIN — PROPOFOL 200 MG: 10 INJECTION, EMULSION INTRAVENOUS at 14:59

## 2024-02-23 RX ADMIN — FAMOTIDINE 20 MG: 20 TABLET, FILM COATED ORAL at 12:39

## 2024-02-23 RX ADMIN — PREGABALIN 75 MG: 75 CAPSULE ORAL at 12:39

## 2024-02-23 RX ADMIN — SODIUM CHLORIDE, POTASSIUM CHLORIDE, SODIUM LACTATE AND CALCIUM CHLORIDE: 600; 310; 30; 20 INJECTION, SOLUTION INTRAVENOUS at 16:33

## 2024-02-23 RX ADMIN — DEXAMETHASONE SODIUM PHOSPHATE 4 MG: 4 INJECTION INTRA-ARTICULAR; INTRALESIONAL; INTRAMUSCULAR; INTRAVENOUS; SOFT TISSUE at 15:10

## 2024-02-23 NOTE — ANESTHESIA PROCEDURE NOTES
Airway  Urgency: elective    Date/Time: 2/23/2024 3:01 PM  Airway not difficult    General Information and Staff    Patient location during procedure: OR  SRNA: Jess Hansen, FARIDA  Indications and Patient Condition  Indications for airway management: airway protection    Preoxygenated: yes  MILS not maintained throughout  Mask difficulty assessment: 1 - vent by mask    Final Airway Details  Final airway type: endotracheal airway      Successful airway: ETT  Cuffed: yes   Successful intubation technique: direct laryngoscopy  Facilitating devices/methods: intubating stylet  Endotracheal tube insertion site: oral  Blade: Rizvi  Blade size: 2  ETT size (mm): 7.5  Cormack-Lehane Classification: grade I - full view of glottis  Placement verified by: chest auscultation and capnometry   Cuff volume (mL): 8  Measured from: lips  ETT/EBT  to lips (cm): 23  Number of attempts at approach: 1  Assessment: lips, teeth, and gum same as pre-op and atraumatic intubation    Additional Comments  Negative epigastric sounds, Breath sound equal bilaterally with symmetric chest rise and fall

## 2024-02-23 NOTE — ANESTHESIA PREPROCEDURE EVALUATION
Anesthesia Evaluation                  Airway   Mallampati: I  TM distance: >3 FB  Neck ROM: full  No difficulty expected  Dental      Pulmonary    Cardiovascular     ECG reviewed        Neuro/Psych  (+) numbness, psychiatric history Anxiety and Depression  GI/Hepatic/Renal/Endo    (+) obesity    Musculoskeletal     Abdominal    Substance History      OB/GYN          Other                      Anesthesia Plan    ASA 2     general     (Infraclav  right marked)  intravenous induction     Anesthetic plan, risks, benefits, and alternatives have been provided, discussed and informed consent has been obtained with: patient.    Plan discussed with CRNA.    CODE STATUS:

## 2024-02-23 NOTE — DISCHARGE INSTRUCTIONS
InfuBLOCK - Patient Information    What is a pain pump?  The InfuBLOCK pump delivers post-operative, non-narcotic, numbing medication to the nerve near the surgical site for pain relief.     Where can I find information about my pain pump?           For more information about your pain pump, scan the QR code.  For additional patient resources, visit [email or web address]/resources-[email or web address].                                                                                               While your physician is your primary source for information about your treatment there may be times during your treatment that you need assistance with your infusion pump.     If you need assistance take the following steps:    The [employer] Nursing Hotline is Here for You 24/7.  Please call [phone] for the following concerns or complications:    Answers to questions about your infusion pump                 Tubing disconnect  Assistance with pump alarms                                                      Dislodged catheter  Excessive leakage noted from pump                                         Inadequate pain control    2.   [facility] Anesthesia Acute Pain Service: [phone] is available 24/7 for any further needs or concerns about medication or pain control.     -------------------------------------------------------------------------    Nerve Catheter Removal Instructions  When your device is empty:    Remove your catheter by pulling the dressing off slowly (like you would remove a regular bandage). The catheter should pull right out of the skin.  Check that the BLUE tip is intact.                                                                                     If the catheter is stuck, reposition your   extremity and pull slowly until removed.  *If catheter is HURTING and WON'T come out, stop and call [phone] for further assistance.    Remove medication bag from the black carrying case.  Cut the  tubing on right and left side of pump, and discard the medication bag and tubing into garbage.  Place the pump and black carrying case into the plastic bag and then place this into the return box.  Seal box with blue stickers and return to [employer] postal service. THIS IS PRE-PAID POSTAGE.        -------------------------------------------------------------------------    [facility] COLD THERAPY - PATIENT INSTRUCTION SHEET    Cold Compression Therapy for your comfort and rehabilitation  Your caregivers want you to be productive in your rehab and comfortable during your stay. In keeping with those goals, you will be receiving an SMI Cold Therapy Wrap to help ease post-operative pain and swelling that might keep you from getting back on track! Your SMI Cold Therapy Wrap is effective and simple-to-use, and you will be encouraged to apply it throughout your hospital stay and at home through the duration of your recovery.    When you are ready to go home  Be sure to take your SMI Cold Therapy Wrap and both sets of Gel Bags with you for continued comfort and use throughout your rehabilitation. If you don't already have them, ask your nurse or aide to retrieve your SMI Gel Bags from the patient freezer.    Home use precautions  Always follow your medical professional's application instructions upon discharge. Your SMI Cold Therapy Wrap and Gel Bags are designed to last for months following your surgery. Never heat the Gel Bags unless specified by your healthcare provider. Supervision is advised when using this product on children or geriatric patients. To avoid danger of suffocation, please keep the outer plastic packaging away from children & pets.    Cold Therapy Instructions  Place Gel Bags in a freezer set ¾ of the way to max temperature for at least (4) hours. For best results, lay the Gel Bags flat and [id number] in the freezer. Once frozen, slide Gel Bags into the gel pouch and secure your wrap to the affected area with the  straps.  Gel wraps that have been stored in a freezer for an extended period of time may require a (10) minute period of softening up in a room temperature environment before application.  The gel pouch acts as a protective barrier. NEVER place frozen bags directly onto skin, as this may cause frostbite injury.  The [facility] Cold Therapy Wrap is designed to be able to be worm while ambulating. The compression straps can be secured well enough so that the Wrap won't fall off while moving.  Wrap Application Videos can be viewed at [employer].  An additional protective barrier such as clothing, a washcloth, hand-towel or pillowcase may be used during prolonged treatment applications.  The Gel-Pouch and Wrap are both Latex-Free and the Gel Bag ingredients are non toxic.    [facility] Wrap care instructions  The [facility] Cold Therapy Wrap may be hand washed and hung to dry when needed.    [facility] re-order information  Additional [facility] body specific wraps and/or Gel Bags can be re-ordered from [employer] or call [employer] ([phone])

## 2024-02-23 NOTE — ANESTHESIA PROCEDURE NOTES
Peripheral Block      Patient reassessed immediately prior to procedure    Patient location during procedure: pre-op  Reason for block: at surgeon's request and post-op pain management  Performed by  CRNA/CAA: Bert Coleman, CRNA  Assisted by: Jacqueline Vasquez RN  Preanesthetic Checklist  Completed: patient identified, IV checked, site marked, risks and benefits discussed, surgical consent, monitors and equipment checked, pre-op evaluation and timeout performed  Prep:  Pt Position: supine  Sterile barriers:cap, gloves, mask and washed/disinfected hands  Prep: ChloraPrep  Patient monitoring: blood pressure monitoring, continuous pulse oximetry and EKG  Procedure    Sedation: yes  Performed under: local infiltration  Guidance:ultrasound guided    ULTRASOUND INTERPRETATION.  Using ultrasound guidance a 20 G gauge needle was placed in close proximity to the brachial plexus nerve, at which point, under ultrasound guidance anesthetic was injected in the area of the nerve and spread of the anesthesia was seen on ultrasound in close proximity thereto.  There were no abnormalities seen on ultrasound; a digital image was taken; and the patient tolerated the procedure with no complications. Images:still images obtained, printed/placed on chart    Laterality:right  Block Type:infraclavicular  Injection Technique:catheter  Needle Type:Tuohy and echogenic  Needle Gauge:18 G  Resistance on Injection: none  Catheter Size:20 G  Cath Depth at skin: 9 cm    Medications Used: fentaNYL citrate (PF) (SUBLIMAZE) injection - Intravenous   100 mcg - 2/23/2024 12:56:00 PM  bupivacaine PF (MARCAINE) 0.25 % injection - Injection   30 mL - 2/23/2024 1:05:00 PM      Medications  Preservative Free Saline:5ml    Post Assessment  Injection Assessment: negative aspiration for heme, no paresthesia on injection and incremental injection  Patient Tolerance:comfortable throughout block  Complications:no  Additional Notes  CATHETER  The affected upper  "extremity was abducted and rotated 90 degrees at the shoulder, within the patients range of motion. A high-frequency linear transducer, with sterile cover, was placed just medial to the coracoid process, distal third of the clavicle, and inferior to the clavicle. Keeping the transducer is in a parasagittal plane (cephalad to caudad), scanning medially to laterally. The Pectoralis major and minor, brachial plexus, axillary artery and vein, rib and pleura where visualized. The insertion site was prepped and draped in sterile fashion. Skin and cutaneous tissue was infiltrated with 2-5 ml of 1% Lidocaine. Using ultrasound-guidance, an 18-gauge Contiplex Ultra 360 Touhy needle was advanced in plane lateral to the axillary artery and lateral cord of the brachial plexus. Preservative-free normal saline was utilized for hydro-dissection of tissue, advancement of Touhy needle, and to confirm final needle placement posterior to the axillary artery and posterior cord of the brachial plexus. Local anesthetic injection spread, in incremental 3-5 ml injections, was confirmed. Aspiration every 5 ml to prevent intravascular injection. Injection was completed with negative aspiration of blood and negative intravascular injection. Injection pressures were normal with minimal resistance. A 20-gauge Contiplex Echo catheter was placed through the needle and advance out the tip of the Touhy 1-3 cm. The Touhy needle was then removed, and final catheter position verified at the 5-6 o'clock position to the axillary artery and posterior cord. The catheter was secured in usual fashion with skin glue, benzoin, steri-strips, CHG tegaderm and Label noting \"Nerve Block Catheter\". Jerk tape applied at yellow connector and catheter connection.              "

## 2024-02-23 NOTE — H&P
Chief Complaint:    Renal Mass           Consult or Referral:     Mr. Erica Singleton is a 37 year old female seen at the request of Dr. Navarrete.         History of Present Illness:     Erica Singleton is a 37 year old female being seen for right renal mass.  Duration of problem: Incidentally discovered while being worked up for abdominal pain  Previous treatments: Diabetic type II     accompanied by her mother.  Reviewed previous notes from Dr. Navarrete    April was recently seen in the ED with abdominal pain upon evaluation was found to have a right renal mass which was cystic in consistency  She follow-up with an MRI which confirmed the presence of a cystic renal mass with nodular components  She has vague abdominal pain not localized to the right flank region  There is no history of hematuria  She is a diabetic type II not on insulin             Past Medical History:   No past medical history on file.         Past Surgical History:   No past surgical history on file.         Medications     Current Outpatient Medications   Medication     acetaminophen-codeine (TYLENOL #3) 300-30 MG tablet     albuterol (PROAIR HFA/PROVENTIL HFA/VENTOLIN HFA) 108 (90 Base) MCG/ACT inhaler     blood glucose (FREESTYLE LITE) test strip     Cyanocobalamin (B-12) 1000 MCG TBCR     ketorolac (TORADOL) 10 MG tablet     metoclopramide (REGLAN) 5 MG tablet     oxyCODONE-acetaminophen (PERCOCET) 5-325 MG tablet     OZEMPIC, 1 MG/DOSE, 4 MG/3ML SOPN     Semaglutide, 1 MG/DOSE, (OZEMPIC, 1 MG/DOSE,) 4 MG/3ML SOPN     ondansetron (ZOFRAN ODT) 4 MG ODT tab     No current facility-administered medications for this visit.            Family History:   No family history on file.         Social History:     Social History     Socioeconomic History     Marital status:      Spouse name: Not on file     Number of children: Not on file     Years of education: Not on file     Highest education level: Not on file   Occupational History     Not on    Pre-Op H&P  Evaristo Colvin  [id number]  1980      Chief complaint: Right elbow pain      Subjective:  Patient is a 43 y.o.male presents for scheduled surgery by Dr. Ocampo. He anticipates a ELBOW REMOVAL OF BONE SPUR AND REPAIR OF DISTAL TRICEPS - RIGHT today. He reports right elbow pain for about a year. He denies injury. He has occasional numbness and difficulty with  right hand. He tried PT without benefit.       Review of Systems:  Constitutional-- No fever, chills or sweats. No fatigue.  CV-- No chest pain, palpitation or syncope  Resp-- No SOB, cough, hemoptysis  Skin--No rashes or lesions      Allergies: No Known Allergies      Home Meds:  Medications Prior to Admission   Medication Sig Dispense Refill Last Dose    amoxicillin (AMOXIL) 500 MG capsule Take 2 capsules by mouth 2 (Two) Times a Day. (Patient taking differently: Take 2 capsules by mouth 2 (Two) Times a Day. Bilateral ear infection   LAST DOSE ON 2/9/24) 40 capsule 0     buprenorphine-naloxone (SUBOXONE) 8-2 MG per SL tablet 1 tablet Daily.       buPROPion XL (WELLBUTRIN XL) 150 MG 24 hr tablet Take 1 tablet by mouth Every Morning.       cyclobenzaprine (FLEXERIL) 10 MG tablet Take 1 tablet by mouth 2 (Two) Times a Day As Needed for Muscle Spasms. 60 tablet 2     diclofenac (VOLTAREN) 50 MG EC tablet TAKE 1 TABLET BY MOUTH TWICE A DAY 60 tablet 2     fluticasone (FLONASE) 50 MCG/ACT nasal spray 2 sprays into the nostril(s) as directed by provider Daily. 18.2 mL 0     lamoTRIgine (LaMICtal) 100 MG tablet Take 1 tablet by mouth Daily.       naloxone (NARCAN) 4 MG/0.1ML nasal spray PLEASE SEE ATTACHED FOR DETAILED DIRECTIONS       nicotine (NICODERM CQ) 21 MG/24HR patch Place 1 patch on the skin as directed by provider Daily. 30 each 1     nicotine polacrilex (NICORETTE) 4 MG gum As Needed.       omeprazole (priLOSEC) 20 MG capsule TAKE 1 CAPSULE BY MOUTH EVERY DAY (Patient taking differently: Take 1 capsule by mouth Daily.) 30  "file   Tobacco Use     Smoking status: Never Smoker     Smokeless tobacco: Never Used   Substance and Sexual Activity     Alcohol use: Not Currently     Drug use: Never     Sexual activity: Not on file   Other Topics Concern     Not on file   Social History Narrative     Not on file     Social Determinants of Health     Financial Resource Strain: Not on file   Food Insecurity: Not on file   Transportation Needs: Not on file   Physical Activity: Not on file   Stress: Not on file   Social Connections: Not on file   Intimate Partner Violence: Not on file   Housing Stability: Not on file            Allergies:   Amoxicillin         Review of Systems:  From intake questionnaire     Skin: negative  Eyes: negative  Ears/Nose/Throat: negative  Respiratory: No shortness of breath, dyspnea on exertion, cough, or hemoptysis  Cardiovascular: No chest pain or palpitations  Gastrointestinal: negative; no nausea/vomiting, constipation or diarrhea  Genitourinary: as per HPI  Musculoskeletal: negative  Neurologic: negative  Psychiatric: negative  Hematologic/Lymphatic/Immunologic: negative  Endocrine: negative         Physical Exam:     Patient is a 37 year old  female   Vitals: Blood pressure (!) 156/95, pulse 70, temperature 98.5  F (36.9  C), resp. rate 18, height 1.6 m (5' 3\"), weight 75 kg (165 lb 4.8 oz), SpO2 99 %.  Constitutional: Body mass index is 29.28 kg/m .  Alert, no acute distress, oriented, conversant  Eyes: no scleral icterus; extraocular muscles intact, moist conjunctivae  Neck: trachea midline, no thyromegaly  Ears/nose/mouth: throat/mouth:normal, good dentition  Respiratory: no respiratory distress, or pursed lip breathing  Cardiovascular: pulses strong and intact; no obvious jugular venous distension present  Gastrointestinal: soft, nontender, no organomegaly or masses,   Lymphatics: No inguinal adenopathy  Musculoskeletal: extremities normal, no peripheral edema  Skin: no suspicious lesions or rashes  Neuro: " capsule 3     sildenafil (Viagra) 50 MG tablet Take 1 tablet by mouth Daily As Needed for Erectile Dysfunction. 12 tablet 0     Sodium Fluoride (Denta 5000 Plus) 1.1 % cream Apply 1 Application to teeth Daily.            PMH:   Past Medical History:   Diagnosis Date    Anxiety     Carpal tunnel syndrome     Knee sprain     Lumbosacral disc disease      PSH:    Past Surgical History:   Procedure Laterality Date    CARPAL TUNNEL RELEASE WITH CUBITAL TUNNEL RELEASE Bilateral     FINGER SURGERY Left     MIDDLE FINGER REATTACHMENT AFTER SAW INJURY    HAND ARTHROPLASTY Right     [facility] JOINT ARTHROPLASTY    LUMBAR FUSION      X3    MYRINGOTOMY W/ TUBES      WISDOM TOOTH EXTRACTION         Immunization History:  Influenza: UTD  Pneumococcal: No  Tetanus: UTD  Covid : x3    Social History:   Tobacco:   Social History     Tobacco Use   Smoking Status Every Day    Packs/day: 0.50    Years: 20.00    Additional pack years: 0.00    Total pack years: 10.00    Types: Cigarettes   Smokeless Tobacco Never      Alcohol:     Social History     Substance and Sexual Activity   Alcohol Use No         Physical Exam: VS: /93  HR 88  RR 16  T 97.3  Sat 97%RA      General Appearance:    Alert, cooperative, no distress, appears stated age   Head:    Normocephalic, without obvious abnormality, atraumatic   Lungs:     Clear to auscultation bilaterally, respirations unlabored    Heart:   Regular rate and rhythm, S1 and S2 normal    Abdomen:    Soft without tenderness   Extremities:   Extremities normal, atraumatic, no cyanosis or edema   Skin:   Skin color, texture, turgor normal, no rashes or lesions   Neurologic:   Grossly intact     Results Review:     LABS:  Lab Results   Component Value Date    WBC 8.45 02/09/2024    HGB 15.6 02/09/2024    HCT 47.3 02/09/2024    MCV 89.1 02/09/2024     02/09/2024    NEUTROABS 4.27 02/09/2024    GLUCOSE 111 (H) 02/09/2024    BUN 20 02/09/2024    CREATININE 0.99 02/09/2024     02/09/2024    K  4.5 02/09/2024     02/09/2024    CO2 26.0 02/09/2024    CALCIUM 9.4 02/09/2024    ALBUMIN 4.7 02/09/2024    AST 72 (H) 02/09/2024     (H) 02/09/2024    BILITOT 0.4 02/09/2024       RADIOLOGY:  Imaging Results (Last 72 Hours)       ** No results found for the last 72 hours. **            I reviewed the patient's new clinical results.    Cancer Staging (if applicable)  Cancer Patient: __ yes __no __unknown; If yes, clinical stage T:__ N:__M:__, stage group or __N/A      Impression: Right elbow pain      Plan: ELBOW REMOVAL OF BONE SPUR AND REPAIR OF DISTAL TRICEPS - RIGHT      OLIMPIA Harper   2/23/2024   12:32 EST   Alert, oriented, speech and mentation normal  Psych: affect and mood normal, alert and oriented to person, place and time  Gait: Normal  : deferred      Labs and Pathology:    The following labs were reviewed by me and discussed with the patient:    Significant for   Lab Results   Component Value Date    CR 0.79 05/23/2022     No results found for: PSA          Imaging:    The following imaging exams were independently viewed and interpreted by me and discussed with patient:  I reviewed the CT abdomen pelvis and the MRI images with the patient and agree with the findings of the radiologist  CT Scan Abd/Pelvis: Abnormal:   Approximately 3 cm sized mass on the upper pole of the right kidney, confined within the kidney without involvement of lymph nodes or renal vessels  Small cyst in the left kidney  MRI Abd/Pelvis: Abnormal:   2.8 cm mass exophytic from upper for cortex of the right kidney consistent with renal cell carcinoma    Pancreatic cyst small           Assessment and Plan:     Renal malignant tumor, right (H)  We discussed in detail about the right renal mass and its likelihood of being a malignant lesion considering the enhancement on contrast and the thick septa as seen on the MRI along with significant nodular component.  Based on these findings we discussed about possible options of management including partial nephrectomy, cryoablation, and radical nephrectomy.  I discussed the benefits and shortcomings of each of these approaches.  With respect to her mass being less than 3 cm in size there is a less likelihood of her having metastatic disease.  She had concerns regarding the cyst in the pancreas as well as in the opposite kidney which I do not think is metastatic based on my evaluation of the imaging.  We discussed specifically robot-assisted partial nephrectomy and the details of the procedure including the amount of renal parenchyma that might be removed to ensure complete resection.  We discussed  that we would use sutures to repair the kidney after tumor removal along with use of hemostatic agents to ensure hemostasis.  We also discussed about expected hospital course including admission, stay, goals of care and discharge.  We also discussed about the need for a Topete catheter overnight and the possible use of a drain.  Possible complications of the procedure including but not limited to bleeding, need for blood transfusion, surrounding organ injury, DVT, ileus, possible urinary leak and delayed bleeding due to pseudoaneurysms was discussed.  We also discussed about the possibility of conversion to an open procedure and the need for radical nephrectomy.  Risks of that of 2 complications are about 5% based on the current practice patterns.  We also discussed about our goals of follow-up including imaging surveillance following partial nephrectomy which will largely be based on the tumor corrector sticks that we will identify subsequent to that procedure.  We also discussed about the risk of recurrence with partial nephrectomy as well as that following cryoablation and how those 2 approaches are different.  All of the relevant questions were answered and discussed in detail  Based on the discussion we had she was agreeable to proceed ahead with scheduling for partial nephrectomy with a robotic approach on the right side  We will finalize the informed consent on the day of the procedure.  - Adult Urology Referral  - Case Request: NEPHRECTOMY, PARTIAL, ROBOT-ASSISTED, LAPAROSCOPIC WITH INTROPERATIVE ULTRASOUND , POSSIBLE OPEN; Standing  - Case Request: NEPHRECTOMY, PARTIAL, ROBOT-ASSISTED, LAPAROSCOPIC WITH INTROPERATIVE ULTRASOUND , POSSIBLE OPEN  - ketorolac (TORADOL) 10 MG tablet; Take 1 tablet (10 mg) by mouth every 6 hours as needed for moderate pain    Type 2 diabetes mellitus with hyperglycemia, without long-term current use of insulin (H)  Well-controlled type 2 diabetes  Rest as per  primary        Plan:  Scheduled for robot-assisted partial nephrectomy    Orders  Orders Placed This Encounter   Procedures     Case Request: NEPHRECTOMY, PARTIAL, ROBOT-ASSISTED, LAPAROSCOPIC WITH INTROPERATIVE ULTRASOUND , POSSIBLE OPEN       Gordo Rosado MD  [facility]      ==========================    Additional Billing and Coding Information:  Review of external notes as documented above   Review of the result(s) of each unique test - Creatinine    Independent interpretation of a test performed by another physician/other qualified health care professional (not separately reported) -MRI abdomen, CT abdomen pelvis      Discussion of management or test interpretation with external physician/other qualified healthcare professional/appropriate source -       Diagnosis or treatment significantly limited by social determinants of health -       42 minutes spent on the date of the encounter doing chart review, review of test results, interpretation of tests, patient visit, documentation and discussion with family     ==========================

## 2024-02-23 NOTE — OP NOTE
DATE OF OPERATION: 2/23/2024     PREOPERATIVE DIAGNOSIS: Right distal triceps rupture     POSTOPERATIVE DIAGNOSES:  same     PROCEDURES PERFORMED:  Open repair of right distal triceps tendon tear        SURGEON: Brandon Ocampo MD     ASSISTANT: Eveline Rivero PA-C  ** Please note the physician assistant was medically necessary to assist with positioning retraction, arm positioning, care of soft tissues and closure     ANESTHESIA: General plus block.      ESTIMATED BLOOD LOSS: 25cc     COMPLICATIONS: None.      DISPOSITION: To the recovery room in stable condition.       TOURNIQUET TIME:approx 60min at 250 mmHg.     IMPLANT:   Arthrex  2 x 2.6 mm double loaded fibertak anchor    1 x  3.9mm closed eyelet swivelock suture anchor     INDICATIONS: This is a 43-year-old male with  Right distal triceps rupture . After a discussion of risks, benefits, and alternatives, the patient wishes to proceed with total elbow arthroplasty.     DESCRIPTION OF PROCEDURE: On the day of surgery, the patient identified the right elbow as the correct operative extremity. This was initialed by the surgeon with the patient's acknowledgment. The patient then underwent placement of a regional block and was taken to the operating room and placed in the supine position. On induction of adequate anesthesia, the patient was placed in the lateral decubitus position and the right upper extremity was prepped and draped in the usual sterile fashion. A sterile tourniquet was applied. Timeout confirmed the correct patient and operative extremity, and that antibiotics were on board. The arm was exsanguinated with an Esmarch and the tourniquet inflated to 250 mmHg. A posterior incision was made around the elbow and was carried sharply through the skin and subcutaneous tissue. Medial and lateral flaps were developed over the triceps fascia. Medially, the ulnar nerve was identified and protected during the entireity of the case. The distal triceps tear was  then isolated and debrided of nonviable tissue.  We then cleaned the triceps footprint and then decorticated it   2 fibertak were placed over the proximal portion of the footprint.  Sutures were then passed using  several horizontal mattress stitches.  We then held the arm in  approx 45 deg of elbow flexion and tied our tendon down anatomically.  We then used 6 of our more proximal suture limbs and placed them through 1 x  Arthrex 3.9mm swivelock suture anchor in a double row suture bridge fashion.     We then irrigated with sterile saline.  Cycled the elbow through flexion and extension and no gapping was noted.          The subcutaneous tissue was then closed with 2-0 Vicryl and the skin with nylon. A sterile dressing was placed. A long arm posterior splint in 30 deg of flexion was placed. Anesthesia was reversed and the patient was taken to the recovery room in stable condition. All instrument, needle and sponge counts were correct. He will be discharged home when comfortable. All instrument, needle and sponge counts were correct.          Rehab protocol-- would include transition to hinged elbow brace at first postop, we then would start with locking brace at 30deg of flexion, only unlocking it 2-3 times/day for active elbow flexion 0-30 deg with increasing 20 deg per week.  At 6 weeks the brace may be removed and range of motion may be unrestricted.

## 2024-02-23 NOTE — ANESTHESIA POSTPROCEDURE EVALUATION
Patient: Evaristo Colvin    Procedure Summary       Date: 02/23/24 Room / [facility]    Anesthesia Start: 1454 Anesthesia Stop: 1649    Procedure: ELBOW REMOVAL OF BONE SPUR AND REPAIR OF DISTAL TRICEPS - RIGHT (Right: Hand) Diagnosis:       Triceps tendon rupture      (triceps tendon tear)    Surgeons: Brandon Ocampo MD Provider: Nghia Meyers MD    Anesthesia Type: general ASA Status: 2            Anesthesia Type: general    Vitals  No vitals data found for the desired time range.          Post Anesthesia Care and Evaluation    Patient location during evaluation: PACU  Patient participation: complete - patient participated  Level of consciousness: awake and alert  Pain management: adequate    Airway patency: patent  Anesthetic complications: No anesthetic complications  PONV Status: none  Cardiovascular status: hemodynamically stable and acceptable  Respiratory status: nonlabored ventilation, acceptable and nasal cannula  Hydration status: acceptable

## 2024-02-26 DIAGNOSIS — M77.01 MEDIAL EPICONDYLITIS OF RIGHT ELBOW: ICD-10-CM

## 2024-03-01 ENCOUNTER — TELEPHONE (OUTPATIENT)
Dept: FAMILY MEDICINE CLINIC | Facility: CLINIC | Age: 44
End: 2024-03-01
Payer: COMMERCIAL

## 2024-03-01 DIAGNOSIS — Z98.890 HISTORY OF BACK SURGERY: ICD-10-CM

## 2024-03-01 DIAGNOSIS — Z98.890 HISTORY OF ELBOW SURGERY: Primary | ICD-10-CM

## 2024-03-01 DIAGNOSIS — M25.529 ELBOW PAIN, UNSPECIFIED LATERALITY: ICD-10-CM

## 2024-03-01 NOTE — TELEPHONE ENCOUNTER
Caller: Evaristo Colvin    Relationship: Self    Best call back number: PAIN MANAGEMENT    What is the medical concern/diagnosis: PATIENT HAD ELBOW SURGERY ON 2/23/24 AND 3 BACK SURGERIES     What specialty or service is being requested: PAIN MANAGEMENT    Any additional details: PATIENT WOULD LIKE HIS PCP TO PLACE A REFERRAL FOR PAIN MANAGEMENT

## 2024-03-14 ENCOUNTER — HOSPITAL ENCOUNTER (OUTPATIENT)
Dept: GENERAL RADIOLOGY | Facility: HOSPITAL | Age: 44
Discharge: HOME | End: 2024-03-14
Payer: COMMERCIAL

## 2024-03-14 ENCOUNTER — OFFICE VISIT (OUTPATIENT)
Dept: FAMILY MEDICINE CLINIC | Facility: CLINIC | Age: 44
End: 2024-03-14
Payer: COMMERCIAL

## 2024-03-14 VITALS
WEIGHT: 244 LBS | OXYGEN SATURATION: 94 % | HEIGHT: 68 IN | BODY MASS INDEX: 36.98 KG/M2 | SYSTOLIC BLOOD PRESSURE: 122 MMHG | HEART RATE: 95 BPM | DIASTOLIC BLOOD PRESSURE: 80 MMHG | TEMPERATURE: 97.1 F

## 2024-03-14 DIAGNOSIS — M72.2 PLANTAR FASCIITIS: ICD-10-CM

## 2024-03-14 DIAGNOSIS — M79.672 PAIN OF LEFT HEEL: ICD-10-CM

## 2024-03-14 DIAGNOSIS — M79.672 PAIN OF LEFT HEEL: Primary | ICD-10-CM

## 2024-03-14 PROCEDURE — 99212 OFFICE O/P EST SF 10 MIN: CPT

## 2024-03-14 PROCEDURE — 73630 X-RAY EXAM OF FOOT: CPT

## 2024-03-14 PROCEDURE — 1160F RVW MEDS BY RX/DR IN RCRD: CPT

## 2024-03-14 PROCEDURE — 1159F MED LIST DOCD IN RCRD: CPT

## 2024-03-14 NOTE — PROGRESS NOTES
"Chief Complaint   Patient presents with    6 week F/U     Right arm surgery. Also lt foot injury, Not any better.       Subjective      Evaristo Colvin is a 43 y.o. who presents for follow up on foot pain.  Continues to have left heel pain that has not improved. Requesting xray of which an order has already been placed, patient just has not gone for exam. Also in a brace currently on right arm s/p surgery with ortho.     The following portions of the patient's history were reviewed and updated as appropriate: allergies, current medications, past family history, past medical history, past social history, past surgical history, and problem list.    Review of Systems   Constitutional:  Negative for chills and fever.   Respiratory:  Negative for chest tightness and shortness of breath.    Cardiovascular:  Negative for chest pain.   Musculoskeletal:  Positive for arthralgias.       Objective   Vital Signs:  /80   Pulse 95   Temp 97.1 °F (36.2 °C)   Ht 172.7 cm (68\")   Wt 111 kg (244 lb)   SpO2 94%   BMI 37.10 kg/m²               Physical Exam  Vitals and nursing note reviewed.   Constitutional:       Appearance: Normal appearance.   Musculoskeletal:        Feet:    Neurological:      Mental Status: He is alert.          Result Review                     Assessment and Plan  Diagnoses and all orders for this visit:    1. Pain of left heel (Primary)    2. Plantar fasciitis      Xray as ordered. Patient did have a fall a few months ago so we will r/o fracture.  Suspect plantar fasciitis is causing discomfort.  Recommend physical therapy if xray is normal, consider referral to orthopedics otherwise.  Follow up as needed.         Discussed medications prescribed and OTC medications recommended.  Discussed medication safety, possible side effects and how to take or administer medications. Instructed patient to report any adverse reactions, side effects or concerns.     Reviewed physical exam findings and " plan with patient who verbalized understanding and agrees with plan of care. Patient was given opportunity to ask questions and all concerns were addressed prior to the conclusion of today's visit.     Follow Up  No follow-ups on file.  Patient was given instructions and counseling regarding his condition or for health maintenance advice. Please see specific information pulled into the AVS if appropriate.     Note to patient: The 21st Century Cures Act makes medical notes like these available to patients in the interest of transparency. However, be advised this is a medical document. It is intended as peer to peer communication. It is written in medical language and may contain abbreviations or verbiage that are unfamiliar. It may appear blunt or direct. Medical documents are intended to carry relevant information, facts as evident, and the clinical opinion of the provider.

## 2024-03-21 ENCOUNTER — TELEPHONE (OUTPATIENT)
Dept: FAMILY MEDICINE CLINIC | Facility: CLINIC | Age: 44
End: 2024-03-21
Payer: COMMERCIAL

## 2024-03-21 NOTE — TELEPHONE ENCOUNTER
Caller: Evaristo Colvin    Relationship: Self    Best call back number:     What is the medical concern/diagnosis: CHRONIC BACK PAIN AND ELBOW    What specialty or service is being requested: PAIN MANAGEMENT    What is the provider, practice or medical service name: [facility]    What is the office location: [place]    What is the office phone number: [phone]  [phone]    Any additional details: PATIENT WAS REFERRED TO [facility] AND HE DOESN'T WANT TO GO THERE, AND IS ASKING FOR A REFERRAL TO THE ABOVE PROVIDER

## 2024-03-21 NOTE — TELEPHONE ENCOUNTER
REFERRAL WAS SENT TO DR BRADSHAW AND PATIENT WAS SCHEDULED ON 3/14 WITH HIS OFFICE (HE ISN'T ASSOCIATED WITH [facility])     I HAVE SENT A NEW REFERRAL TO [facility] WITH FAX NUMBER PROVIDED AND THEY WILL CALL PATIENT TO SCHEDULE.

## 2024-03-27 DIAGNOSIS — H69.93 DYSFUNCTION OF BOTH EUSTACHIAN TUBES: ICD-10-CM

## 2024-03-27 RX ORDER — FLUTICASONE PROPIONATE 50 MCG
2 SPRAY, SUSPENSION (ML) NASAL DAILY
Qty: 16 ML | Refills: 0 | Status: SHIPPED | OUTPATIENT
Start: 2024-03-27

## 2024-04-15 ENCOUNTER — OFFICE VISIT (OUTPATIENT)
Dept: UROLOGY | Facility: CLINIC | Age: 44
End: 2024-04-15
Payer: COMMERCIAL

## 2024-04-15 ENCOUNTER — LAB (OUTPATIENT)
Dept: LAB | Facility: HOSPITAL | Age: 44
End: 2024-04-15
Payer: COMMERCIAL

## 2024-04-15 VITALS — WEIGHT: 240 LBS | HEART RATE: 98 BPM | OXYGEN SATURATION: 95 % | BODY MASS INDEX: 36.37 KG/M2 | HEIGHT: 68 IN

## 2024-04-15 DIAGNOSIS — E29.1 HYPOGONADISM IN MALE: ICD-10-CM

## 2024-04-15 DIAGNOSIS — N52.9 ERECTILE DYSFUNCTION, UNSPECIFIED ERECTILE DYSFUNCTION TYPE: Primary | ICD-10-CM

## 2024-04-15 PROCEDURE — 36415 COLL VENOUS BLD VENIPUNCTURE: CPT | Performed by: PHYSICIAN ASSISTANT

## 2024-04-15 PROCEDURE — 84403 ASSAY OF TOTAL TESTOSTERONE: CPT | Performed by: PHYSICIAN ASSISTANT

## 2024-04-15 RX ORDER — ESZOPICLONE 1 MG/1
1 TABLET, FILM COATED ORAL NIGHTLY PRN
COMMUNITY
Start: 2024-03-18

## 2024-04-15 RX ORDER — TADALAFIL 20 MG/1
20 TABLET ORAL DAILY PRN
Qty: 30 TABLET | Refills: 3 | Status: SHIPPED | OUTPATIENT
Start: 2024-04-15

## 2024-04-15 NOTE — PROGRESS NOTES
"Chief Complaint  Erectile dysfunction    Referring Provider  Muna Lauren APRN HPI  Mr. Colvin is a 43 y.o. male who presents with ED.    He has previously tried Cialis at 10 mg without results, waiting approximately 1 hour before desired effect.  He has most recently tried Viagra 100 mg.  Denies adverse effects, did not take on an empty stomach, but did have about 80% erection.    In addition to erectile dysfunction, he is experiencing mood changes, irritability, and weight loss despite increasing activity and changing eating habits.  He is concerned about hypogonadism and has been screened in the past with borderline levels.        Past Medical History  Past Medical History:   Diagnosis Date    Anxiety     Carpal tunnel syndrome     Knee sprain     Lumbosacral disc disease        Past Surgical History  Past Surgical History:   Procedure Laterality Date    CARPAL TUNNEL RELEASE WITH CUBITAL TUNNEL RELEASE Bilateral     FINGER SURGERY Left     MIDDLE FINGER REATTACHMENT AFTER SAW INJURY    HAND ARTHROPLASTY Right     CMC JOINT ARTHROPLASTY    LUMBAR FUSION      X3    MYRINGOTOMY W/ TUBES      TRICEP TENDON REPAIR Right 2/23/2024    Procedure: ELBOW REMOVAL OF BONE SPUR AND REPAIR OF DISTAL TRICEPS - RIGHT;  Surgeon: Brandon Ocampo MD;  Location: [facility];  Service: Orthopedics;  Laterality: Right;    WISDOM TOOTH EXTRACTION         Medications  He has a current medication list which includes the following prescription(s): buprenorphine-naloxone, bupropion xl, cyclobenzaprine, diclofenac, fluticasone, lamotrigine, naloxone, nicotine, nicotine polacrilex, omeprazole, ondansetron, oxycodone, and sildenafil.    Allergies  No Known Allergies    Social History  He  reports that he has been smoking cigarettes. He has a 10 pack-year smoking history. He has never used smokeless tobacco. He reports that he does not drink alcohol and does not use drugs.        Physical Exam  Ht 172.7 cm (68\")   BMI 37.10 " kg/m²   Body mass index is 37.1 kg/m².      Labs  Hematocrit (%)   Date Value   02/09/2024 47.3   09/05/2023 41.7   07/26/2023 42.9   06/02/2023 47.2           Assessment  Mr. Colvin is a 43 y.o. male with erectile dysfunction.  Risk factors include smoking history, BMI 36, hypogonadism.    We reviewed his previous total testosterone values, 279 and 319.  Given his above symptoms, I suspect he does have underlying hypogonadism and I recommend we repeat a total testosterone today.  I suspect will likely be under 300 and he would benefit from HRT.    I did advise the patient that continued risk factor management such as ongoing abstinence from smoking, weight loss, increased physical activity will help to improve his erectile dysfunction.  HRT alone is unlikely to completely cure erectile dysfunction.     We discussed the common time of affect and considerations between Cialis and Viagra.  He took a maximum dose of Viagra with mostly adequate response, though not on an empty stomach.  I actually recommend that he repeat a trial of Cialis as it has a much longer half-life, allowing for greater spontaneity, and does not have to be taken on an empty stomach.  I recommend this time that he maximize the dose to 20 mg.  If this is not effective, he can return to Viagra and we will try it on an empty stomach.    Plan  1.  Trial of Cialis 20 mg  2.  Repeat total T today  3.  Anticipate repeating total T and CBC prior to follow-up with likely discussion of HRT to follow

## 2024-04-16 LAB — TESTOST SERPL-MCNC: 168 NG/DL (ref 249–836)

## 2024-04-25 ENCOUNTER — LAB (OUTPATIENT)
Dept: LAB | Facility: HOSPITAL | Age: 44
End: 2024-04-25
Payer: COMMERCIAL

## 2024-04-25 DIAGNOSIS — E29.1 HYPOGONADISM IN MALE: ICD-10-CM

## 2024-04-25 LAB — TESTOST SERPL-MCNC: 178 NG/DL (ref 249–836)

## 2024-04-25 PROCEDURE — 84403 ASSAY OF TOTAL TESTOSTERONE: CPT

## 2024-04-25 PROCEDURE — 36415 COLL VENOUS BLD VENIPUNCTURE: CPT

## 2024-04-29 ENCOUNTER — OFFICE VISIT (OUTPATIENT)
Age: 44
End: 2024-04-29
Payer: COMMERCIAL

## 2024-04-29 VITALS
WEIGHT: 240 LBS | SYSTOLIC BLOOD PRESSURE: 155 MMHG | BODY MASS INDEX: 36.37 KG/M2 | OXYGEN SATURATION: 95 % | HEART RATE: 99 BPM | DIASTOLIC BLOOD PRESSURE: 96 MMHG | HEIGHT: 68 IN

## 2024-04-29 DIAGNOSIS — E29.1 HYPOGONADISM MALE: Primary | ICD-10-CM

## 2024-04-29 DIAGNOSIS — N52.9 ERECTILE DYSFUNCTION, UNSPECIFIED ERECTILE DYSFUNCTION TYPE: ICD-10-CM

## 2024-04-29 RX ORDER — TESTOSTERONE CYPIONATE 200 MG/ML
100 INJECTION, SOLUTION INTRAMUSCULAR WEEKLY
Qty: 2 ML | Refills: 0 | Status: SHIPPED | OUTPATIENT
Start: 2024-04-29

## 2024-04-29 RX ORDER — TADALAFIL 20 MG/1
20 TABLET ORAL DAILY PRN
Qty: 30 TABLET | Refills: 11 | Status: SHIPPED | OUTPATIENT
Start: 2024-04-29

## 2024-04-29 RX ORDER — BLOOD PRESSURE TEST KIT
KIT MISCELLANEOUS
Qty: 100 EACH | Refills: 11 | Status: SHIPPED | OUTPATIENT
Start: 2024-04-29

## 2024-04-29 NOTE — PROGRESS NOTES
Established Male Office Visit        Chief Complaint   Patient presents with    Erectile dysfunction, unspecified erectile dysfunction type        HPI  Mr. Colvin is a 43 y.o. male with history of ED who presents for follow up.     At this visit, ready to discuss HRT after recent confirmatory labs. States that he is pleased with Cialis, had excellent erectile response without adverse effects.     Past Medical History:   Diagnosis Date    Anxiety     Carpal tunnel syndrome     Knee sprain     Lumbosacral disc disease        Past Surgical History:   Procedure Laterality Date    CARPAL TUNNEL RELEASE WITH CUBITAL TUNNEL RELEASE Bilateral     FINGER SURGERY Left     MIDDLE FINGER REATTACHMENT AFTER SAW INJURY    HAND ARTHROPLASTY Right     CMC JOINT ARTHROPLASTY    LUMBAR FUSION      X3    MYRINGOTOMY W/ TUBES      TRICEP TENDON REPAIR Right 2/23/2024    Procedure: ELBOW REMOVAL OF BONE SPUR AND REPAIR OF DISTAL TRICEPS - RIGHT;  Surgeon: Brandon Ocampo MD;  Location: [facility];  Service: Orthopedics;  Laterality: Right;    WISDOM TOOTH EXTRACTION           Current Outpatient Medications:     buprenorphine-naloxone (SUBOXONE) 8-2 MG per SL tablet, 1 tablet Daily., Disp: , Rfl:     buPROPion XL (WELLBUTRIN XL) 150 MG 24 hr tablet, Take 1 tablet by mouth Every Morning., Disp: , Rfl:     cyclobenzaprine (FLEXERIL) 10 MG tablet, Take 1 tablet by mouth 2 (Two) Times a Day As Needed for Muscle Spasms., Disp: 60 tablet, Rfl: 2    diclofenac (VOLTAREN) 50 MG EC tablet, TAKE 1 TABLET BY MOUTH TWICE A DAY, Disp: 60 tablet, Rfl: 2    eszopiclone (LUNESTA) 1 MG tablet, Take 1 tablet by mouth At Night As Needed for Sleep., Disp: , Rfl:     fluticasone (FLONASE) 50 MCG/ACT nasal spray, SPRAY 2 SPRAYS INTO THE NOSTRIL AS DIRECTED BY PROVIDER DAILY., Disp: 16 mL, Rfl: 0    lamoTRIgine (LaMICtal) 100 MG tablet, Take 1 tablet by mouth Daily., Disp: , Rfl:     nicotine polacrilex (NICORETTE) 4 MG gum, As Needed., Disp: ,  "Rfl:     tadalafil (Cialis) 20 MG tablet, Take 1 tablet by mouth Daily As Needed for Erectile Dysfunction., Disp: 30 tablet, Rfl: 11    Alcohol Swabs pads, Clean area prior to injection, Disp: 100 each, Rfl: 11    naloxone (NARCAN) 4 MG/0.1ML nasal spray, PLEASE SEE ATTACHED FOR DETAILED DIRECTIONS (Patient not taking: Reported on 4/29/2024), Disp: , Rfl:     Needle, Disp, (Hypodermic Needle) 23G X 1\" misc, Use 1 Device As Needed (Injection of Testosterone)., Disp: 50 each, Rfl: 11    Needle, Disp, 18G X 1-1/2\" misc, Use for drawing up the medication, Disp: 50 each, Rfl: 3    nicotine (NICODERM CQ) 21 MG/24HR patch, PLACE 1 PATCH ON THE SKIN AS DIRECTED BY PROVIDER DAILY., Disp: 28 patch, Rfl: 1    omeprazole (priLOSEC) 20 MG capsule, TAKE 1 CAPSULE BY MOUTH EVERY DAY (Patient taking differently: Take 1 capsule by mouth Daily.), Disp: 30 capsule, Rfl: 3    ondansetron (Zofran) 4 MG tablet, Take 1 tablet by mouth Every 8 (Eight) Hours As Needed for Nausea or Vomiting. (Patient not taking: Reported on 4/29/2024), Disp: 30 tablet, Rfl: 0    oxyCODONE (ROXICODONE) 5 MG immediate release tablet, Take 1 tablet by mouth Every 6 (Six) Hours As Needed for Moderate Pain. (Patient not taking: Reported on 4/15/2024), Disp: 30 tablet, Rfl: 0    Syringe, Disposable, 3 ML misc, Use 1 syringe 1 (One) Time Per Week., Disp: 100 each, Rfl: 11    Testosterone Cypionate (Depo-Testosterone) 200 MG/ML injection, Inject 0.5 mL into the appropriate muscle as directed by prescriber 1 (One) Time Per Week., Disp: 2 mL, Rfl: 0     Physical Exam  Visit Vitals  /96   Pulse 99   Ht 172.7 cm (67.99\")   Wt 109 kg (240 lb)   SpO2 95%   BMI 36.50 kg/m²       Labs  Brief Urine Lab Results  (Last result in the past 365 days)        Color   Clarity   Blood   Leuk Est   Nitrite   Protein   CREAT   Urine HCG        09/05/23 0859 Yellow   Clear   Negative   Negative   Negative   Negative                     Lab Results   Component Value Date    " "GLUCOSE 111 (H) 02/09/2024    CALCIUM 9.4 02/09/2024     02/09/2024    K 4.5 02/09/2024    CO2 26.0 02/09/2024     02/09/2024    BUN 20 02/09/2024    CREATININE 0.99 02/09/2024    BCR 20.2 02/09/2024    ANIONGAP 10.0 02/09/2024       Lab Results   Component Value Date    WBC 8.45 02/09/2024    HGB 15.6 02/09/2024    HCT 47.3 02/09/2024    MCV 89.1 02/09/2024     02/09/2024            Assessment / Plan      Assessment/Plan:   Mr. Colvin is a 43 y.o. male with hypogonadism and ED.     Two total testosterone values well below 300, 168 and 178. Symptoms of hypogonadism discussed in my last note: \"In addition to erectile dysfunction, he is experiencing mood changes, irritability, and minimal weight loss despite increasing activity and changing eating habits.\"     We discussed options for treatment and he would like to proceed with TC injections. He is instructed in how to perform IM injections on the lateral thigh. All side effects of HRT covered and given to patient on a handout.     Diagnoses and all orders for this visit:    1. Hypogonadism male (Primary)  -     Testosterone Cypionate (Depo-Testosterone) 200 MG/ML injection; Inject 0.5 mL into the appropriate muscle as directed by prescriber 1 (One) Time Per Week.  Dispense: 2 mL; Refill: 0  -     Needle, Disp, 18G X 1-1/2\" misc; Use for drawing up the medication  Dispense: 50 each; Refill: 3  -     Needle, Disp, (Hypodermic Needle) 23G X 1\" misc; Use 1 Device As Needed (Injection of Testosterone).  Dispense: 50 each; Refill: 11  -     Alcohol Swabs pads; Clean area prior to injection  Dispense: 100 each; Refill: 11  -     Syringe, Disposable, 3 ML misc; Use 1 syringe 1 (One) Time Per Week.  Dispense: 100 each; Refill: 11  -     Hemoglobin & Hematocrit, Blood; Future  -     Testosterone; Future    2. Erectile dysfunction, unspecified erectile dysfunction type  -     tadalafil (Cialis) 20 MG tablet; Take 1 tablet by mouth Daily As Needed for " Erectile Dysfunction.  Dispense: 30 tablet; Refill: 11         Follow Up:   Return in about 3 months (around 7/29/2024) for Testo, Labs Prior.      Karina Liang PA-C  [facility]

## 2024-04-30 DIAGNOSIS — F17.210 CIGARETTE NICOTINE DEPENDENCE WITHOUT COMPLICATION: ICD-10-CM

## 2024-04-30 RX ORDER — NICOTINE 21 MG/24HR
1 PATCH, TRANSDERMAL 24 HOURS TRANSDERMAL EVERY 24 HOURS
Qty: 28 PATCH | Refills: 1 | Status: SHIPPED | OUTPATIENT
Start: 2024-04-30

## 2024-05-23 DIAGNOSIS — E29.1 HYPOGONADISM MALE: ICD-10-CM

## 2024-05-23 RX ORDER — TESTOSTERONE CYPIONATE 200 MG/ML
100 INJECTION, SOLUTION INTRAMUSCULAR WEEKLY
Qty: 2 ML | Refills: 0 | Status: SHIPPED | OUTPATIENT
Start: 2024-05-23

## 2024-05-23 NOTE — TELEPHONE ENCOUNTER
Rx Refill Note  Requested Prescriptions     Pending Prescriptions Disp Refills    Testosterone Cypionate (DEPOTESTOTERONE CYPIONATE) 200 MG/ML injection [Pharmacy Med Name: TESTOSTERONE  MG/ML] 2 mL 0     Sig: INJECT 0.5 ML INTO THE APPROPRIATE MUSCLE AS DIRECTED BY PRESCRIBER 1 (ONE) TIME PER WEEK.      Last office visit with prescribing clinician: 4/29/2024   Next office visit with prescribing clinician: 07/29/2024      Beatriz Graves MA  05/23/24, 11:00 EDT

## 2024-06-03 ENCOUNTER — OFFICE VISIT (OUTPATIENT)
Dept: FAMILY MEDICINE CLINIC | Facility: CLINIC | Age: 44
End: 2024-06-03
Payer: COMMERCIAL

## 2024-06-03 VITALS
DIASTOLIC BLOOD PRESSURE: 90 MMHG | SYSTOLIC BLOOD PRESSURE: 130 MMHG | BODY MASS INDEX: 37.5 KG/M2 | WEIGHT: 247.4 LBS | TEMPERATURE: 97.6 F | RESPIRATION RATE: 20 BRPM | HEART RATE: 89 BPM | OXYGEN SATURATION: 98 % | HEIGHT: 68 IN

## 2024-06-03 DIAGNOSIS — E66.9 CLASS 2 OBESITY: ICD-10-CM

## 2024-06-03 DIAGNOSIS — R60.0 BILATERAL LEG EDEMA: ICD-10-CM

## 2024-06-03 DIAGNOSIS — I87.8 VENOUS STASIS: ICD-10-CM

## 2024-06-03 DIAGNOSIS — R63.5 ABNORMAL WEIGHT GAIN: Primary | ICD-10-CM

## 2024-06-03 PROCEDURE — 1126F AMNT PAIN NOTED NONE PRSNT: CPT | Performed by: FAMILY MEDICINE

## 2024-06-03 PROCEDURE — 99214 OFFICE O/P EST MOD 30 MIN: CPT | Performed by: FAMILY MEDICINE

## 2024-06-03 RX ORDER — FUROSEMIDE 40 MG/1
40 TABLET ORAL 2 TIMES DAILY
Qty: 30 TABLET | Refills: 1 | Status: SHIPPED | OUTPATIENT
Start: 2024-06-03

## 2024-06-03 NOTE — PROGRESS NOTES
"Chief Complaint   Patient presents with    bilateral leg edema        Subjective      Evaristo Colvin is a 43 y.o. who presents for bilateral lower extremity edema contributing to weight gain.  Patient has gained 70 pounds in the last 11 months.  Over the last month he has noticed increased swelling around the lower legs and ankles.  Patient admits he is spending more time on weightbearing activities.  He does not feel like his diet consists of excess sodium.  He denies ARAMBULA, orthopnea, PND, chest pain.  He does note excessive sweating.    Objective   Vital Signs:  /90   Pulse 89   Temp 97.6 °F (36.4 °C)   Resp 20   Ht 172.7 cm (67.99\")   Wt 112 kg (247 lb 6.4 oz)   SpO2 98%   BMI 37.63 kg/m²     Physical Exam  Constitutional:       Appearance: Normal appearance.   HENT:      Head: Normocephalic and atraumatic.      Right Ear: Tympanic membrane and ear canal normal.      Left Ear: Tympanic membrane and ear canal normal.      Nose: Nose normal.      Mouth/Throat:      Mouth: Mucous membranes are moist.      Pharynx: Oropharynx is clear.   Eyes:      Conjunctiva/sclera: Conjunctivae normal.   Cardiovascular:      Rate and Rhythm: Normal rate and regular rhythm.      Heart sounds: Normal heart sounds. No murmur heard.  Pulmonary:      Effort: Pulmonary effort is normal. No respiratory distress.      Breath sounds: Normal breath sounds.   Abdominal:      General: Abdomen is flat. Bowel sounds are normal. There is no distension.      Palpations: Abdomen is soft.      Tenderness: There is no abdominal tenderness.   Musculoskeletal:      Cervical back: Normal range of motion and neck supple. No tenderness.      Right lower leg: Edema present.      Left lower leg: Edema present.   Lymphadenopathy:      Cervical: No cervical adenopathy.   Skin:     General: Skin is warm and dry.   Neurological:      Mental Status: He is alert.   Psychiatric:         Mood and Affect: Mood normal.          Result Review "                     Assessment and Plan  Diagnoses and all orders for this visit:    1. Abnormal weight gain (Primary)  -     TSH Rfx On Abnormal To Free T4  -     Basic Metabolic Panel    2. Venous stasis  -     furosemide (Lasix) 40 MG tablet; Take 1 tablet by mouth 2 (Two) Times a Day.  Dispense: 30 tablet; Refill: 1    3. Class 2 obesity    4. Bilateral leg edema  -     furosemide (Lasix) 40 MG tablet; Take 1 tablet by mouth 2 (Two) Times a Day.  Dispense: 30 tablet; Refill: 1    Plan: Patient does have signs of venous insufficiency and will be given Lasix to use temporarily.  Edema will be reassessed in 3 months.  For his abnormal weight gain he has had extensive labs with the exception of TSH.  TSH will be checked.  BMP will also be drawn to monitor for any acute kidney injury possibly coming from NSAID use that could contribute to edema.        Follow Up  Return in about 3 weeks (around 6/24/2024) for Recheck edema.  Patient was given instructions and counseling regarding his condition or for health maintenance advice. Please see specific information pulled into the AVS if appropriate.

## 2024-06-04 LAB
BUN SERPL-MCNC: 12 MG/DL (ref 6–24)
BUN/CREAT SERPL: 14 (ref 9–20)
CALCIUM SERPL-MCNC: 9.7 MG/DL (ref 8.7–10.2)
CHLORIDE SERPL-SCNC: 102 MMOL/L (ref 96–106)
CO2 SERPL-SCNC: 24 MMOL/L (ref 20–29)
CREAT SERPL-MCNC: 0.87 MG/DL (ref 0.76–1.27)
EGFRCR SERPLBLD CKD-EPI 2021: 109 ML/MIN/1.73
GLUCOSE SERPL-MCNC: 138 MG/DL (ref 70–99)
POTASSIUM SERPL-SCNC: 4.8 MMOL/L (ref 3.5–5.2)
SODIUM SERPL-SCNC: 140 MMOL/L (ref 134–144)
TSH SERPL DL<=0.005 MIU/L-ACNC: 3.22 UIU/ML (ref 0.45–4.5)

## 2024-06-25 DIAGNOSIS — E29.1 HYPOGONADISM MALE: ICD-10-CM

## 2024-06-25 RX ORDER — TESTOSTERONE CYPIONATE 200 MG/ML
100 INJECTION, SOLUTION INTRAMUSCULAR WEEKLY
Qty: 2 ML | Refills: 0 | Status: SHIPPED | OUTPATIENT
Start: 2024-06-25

## 2024-06-25 NOTE — TELEPHONE ENCOUNTER
Rx Refill Note  Requested Prescriptions     Pending Prescriptions Disp Refills    Testosterone Cypionate (DEPOTESTOTERONE CYPIONATE) 200 MG/ML injection [Pharmacy Med Name: TESTOSTERONE  MG/ML] 2 mL 0     Sig: INJECT 0.5 ML INTO THE APPROPRIATE MUSCLE AS DIRECTED BY PRESCRIBER 1 (ONE) TIME PER WEEK.      Last office visit with prescribing clinician: 4/29/2024   Next office visit with prescribing clinician: 07/29/2024      Beatriz Graves MA  06/25/24, 15:59 EDT

## 2024-06-28 DIAGNOSIS — E29.1 HYPOGONADISM MALE: ICD-10-CM

## 2024-07-05 ENCOUNTER — OFFICE VISIT (OUTPATIENT)
Dept: FAMILY MEDICINE CLINIC | Facility: CLINIC | Age: 44
End: 2024-07-05
Payer: COMMERCIAL

## 2024-07-05 VITALS
TEMPERATURE: 97.9 F | OXYGEN SATURATION: 92 % | HEIGHT: 68 IN | HEART RATE: 90 BPM | BODY MASS INDEX: 37.28 KG/M2 | SYSTOLIC BLOOD PRESSURE: 116 MMHG | DIASTOLIC BLOOD PRESSURE: 72 MMHG | RESPIRATION RATE: 14 BRPM | WEIGHT: 246 LBS

## 2024-07-05 DIAGNOSIS — E66.01 CLASS 2 SEVERE OBESITY DUE TO EXCESS CALORIES WITH SERIOUS COMORBIDITY AND BODY MASS INDEX (BMI) OF 37.0 TO 37.9 IN ADULT: ICD-10-CM

## 2024-07-05 DIAGNOSIS — I87.8 VENOUS STASIS: Primary | ICD-10-CM

## 2024-07-05 DIAGNOSIS — R60.0 BILATERAL LEG EDEMA: ICD-10-CM

## 2024-07-05 DIAGNOSIS — Z79.899 LONG TERM CURRENT USE OF DIURETIC: ICD-10-CM

## 2024-07-05 DIAGNOSIS — M77.01 MEDIAL EPICONDYLITIS OF RIGHT ELBOW: ICD-10-CM

## 2024-07-05 DIAGNOSIS — M72.2 PLANTAR FASCIITIS OF LEFT FOOT: ICD-10-CM

## 2024-07-05 PROCEDURE — 1126F AMNT PAIN NOTED NONE PRSNT: CPT | Performed by: FAMILY MEDICINE

## 2024-07-05 PROCEDURE — 99214 OFFICE O/P EST MOD 30 MIN: CPT | Performed by: FAMILY MEDICINE

## 2024-07-05 RX ORDER — TOPIRAMATE 25 MG/1
25 TABLET ORAL 2 TIMES DAILY
Qty: 60 TABLET | Refills: 2 | Status: SHIPPED | OUTPATIENT
Start: 2024-07-05

## 2024-07-05 RX ORDER — POTASSIUM CHLORIDE 20 MEQ/1
20 TABLET, EXTENDED RELEASE ORAL 2 TIMES DAILY
Qty: 30 TABLET | Refills: 2 | Status: SHIPPED | OUTPATIENT
Start: 2024-07-05

## 2024-07-05 RX ORDER — LAMOTRIGINE 150 MG/1
150 TABLET ORAL
COMMUNITY
Start: 2024-06-12

## 2024-07-05 RX ORDER — FUROSEMIDE 40 MG/1
40 TABLET ORAL 2 TIMES DAILY
Qty: 60 TABLET | Refills: 2 | Status: SHIPPED | OUTPATIENT
Start: 2024-07-05

## 2024-07-05 RX ORDER — ZOLPIDEM TARTRATE 5 MG/1
5 TABLET ORAL
COMMUNITY

## 2024-07-05 NOTE — PROGRESS NOTES
"Chief Complaint   Patient presents with    3wk f/u edema     A little better       Subjective      Evaristo Colvin is a 43 y.o. who presents for follow-up on his bilateral lower extremity edema from venous stasis.  He reports improvement in edema with daily use of furosemide 40 mg and resolution of the erythema that was present and developing on the legs.  He does however note increased joint pains after having to discontinue all NSAIDs due to the possibility they contribute to his edema.    Patient also reports ongoing left heel pain since an injury in February.  His work shoes are 9 months old.  He has an altered gait due to the pain and it is now affecting his right foot.  X-rays were negative.    Class II obesity.  Patient has had a 70 pound weight gain in the last year.  Majority of the weight gain occurred during the fall 2023 when the patient was on different antipsychotics including Abilify and Seroquel for a brief period of time.  Patient notes with those medications he had increased appetite.    The following portions of the patient's history were reviewed and updated as appropriate: allergies, current medications, past family history, past medical history, past social history, past surgical history, and problem list.    Review of Systems    Objective   Vital Signs:  /72   Pulse 90   Temp 97.9 °F (36.6 °C)   Resp 14   Ht 172.7 cm (67.99\")   Wt 112 kg (246 lb) Comment: steel toes  SpO2 92%   BMI 37.42 kg/m²               Physical Exam  Vitals reviewed.   Constitutional:       Appearance: Normal appearance.   HENT:      Head: Normocephalic.      Right Ear: Tympanic membrane normal.      Left Ear: Tympanic membrane normal.      Nose: Nose normal.      Mouth/Throat:      Mouth: Mucous membranes are moist.   Eyes:      Conjunctiva/sclera: Conjunctivae normal.      Pupils: Pupils are equal, round, and reactive to light.   Cardiovascular:      Rate and Rhythm: Normal rate and regular rhythm. "      Pulses: Normal pulses.      Heart sounds: Normal heart sounds.   Pulmonary:      Effort: Pulmonary effort is normal.      Breath sounds: Normal breath sounds.   Musculoskeletal:      Right lower leg: Edema present.      Left lower leg: Edema present.   Feet:      Comments: Patient has left medial and plantar heel tenderness  Neurological:      Mental Status: He is alert.          Result Review                     Assessment and Plan  Diagnoses and all orders for this visit:    1. Venous stasis (Primary)  Comments:  Improved.  Continue Lasix 40 mg once daily with second dose available if needed.  Add potassium supplement.  Reassess in 2 months  Orders:  -     furosemide (Lasix) 40 MG tablet; Take 1 tablet by mouth 2 (Two) Times a Day.  Dispense: 60 tablet; Refill: 2    2. Bilateral leg edema  -     furosemide (Lasix) 40 MG tablet; Take 1 tablet by mouth 2 (Two) Times a Day.  Dispense: 60 tablet; Refill: 2    3. Long term current use of diuretic  -     potassium chloride (KLOR-CON M20) 20 MEQ CR tablet; Take 1 tablet by mouth 2 (Two) Times a Day.  Dispense: 30 tablet; Refill: 2    4. Medial epicondylitis of right elbow  Comments:  Resume as needed use of NSAID.  Monitor for edema  Orders:  -     diclofenac (VOLTAREN) 50 MG EC tablet; Take 1 tablet by mouth 2 (Two) Times a Day.  Dispense: 60 tablet; Refill: 2    5. Class 2 severe obesity due to excess calories with serious comorbidity and body mass index (BMI) of 37.0 to 37.9 in adult  Comments:  Unchanged.  Start topiramate 25 mg twice daily.  Reassess in 2 months  Orders:  -     topiramate (TOPAMAX) 25 MG tablet; Take 1 tablet by mouth 2 (Two) Times a Day.  Dispense: 60 tablet; Refill: 2    6. Plantar fasciitis of left foot  Comments:  Recommended new footwear or shoe inserts.  If no improvement over 2 to 3 weeks refer to podiatry            Follow Up  Return in about 2 months (around 9/5/2024) for Recheck Weight, Edema, .  Patient was given instructions and  counseling regarding his condition or for health maintenance advice. Please see specific information pulled into the AVS if appropriate.

## 2024-07-11 DIAGNOSIS — K21.9 GASTROESOPHAGEAL REFLUX DISEASE, UNSPECIFIED WHETHER ESOPHAGITIS PRESENT: ICD-10-CM

## 2024-07-11 RX ORDER — OMEPRAZOLE 20 MG/1
20 CAPSULE, DELAYED RELEASE ORAL DAILY
Qty: 30 CAPSULE | Refills: 3 | Status: SHIPPED | OUTPATIENT
Start: 2024-07-11

## 2024-07-17 ENCOUNTER — OFFICE VISIT (OUTPATIENT)
Dept: FAMILY MEDICINE CLINIC | Facility: CLINIC | Age: 44
End: 2024-07-17
Payer: COMMERCIAL

## 2024-07-17 ENCOUNTER — TELEPHONE (OUTPATIENT)
Dept: FAMILY MEDICINE CLINIC | Facility: CLINIC | Age: 44
End: 2024-07-17

## 2024-07-17 VITALS
HEART RATE: 108 BPM | SYSTOLIC BLOOD PRESSURE: 100 MMHG | WEIGHT: 248 LBS | DIASTOLIC BLOOD PRESSURE: 66 MMHG | BODY MASS INDEX: 38.92 KG/M2 | HEIGHT: 67 IN | TEMPERATURE: 98.4 F | OXYGEN SATURATION: 98 %

## 2024-07-17 DIAGNOSIS — R73.09 ELEVATED HEMOGLOBIN A1C: ICD-10-CM

## 2024-07-17 DIAGNOSIS — J02.9 ACUTE PHARYNGITIS, UNSPECIFIED ETIOLOGY: ICD-10-CM

## 2024-07-17 DIAGNOSIS — B37.0 ORAL THRUSH: ICD-10-CM

## 2024-07-17 DIAGNOSIS — E11.9 TYPE 2 DIABETES MELLITUS WITHOUT COMPLICATION, WITHOUT LONG-TERM CURRENT USE OF INSULIN: ICD-10-CM

## 2024-07-17 DIAGNOSIS — K12.2 UVULITIS: Primary | ICD-10-CM

## 2024-07-17 LAB
EXPIRATION DATE: ABNORMAL
EXPIRATION DATE: NORMAL
HBA1C MFR BLD: 6.6 % (ref 4.5–5.7)
INTERNAL CONTROL: NORMAL
Lab: ABNORMAL
Lab: NORMAL
S PYO AG THROAT QL: NEGATIVE

## 2024-07-17 PROCEDURE — 83036 HEMOGLOBIN GLYCOSYLATED A1C: CPT | Performed by: PHYSICIAN ASSISTANT

## 2024-07-17 PROCEDURE — 99213 OFFICE O/P EST LOW 20 MIN: CPT | Performed by: PHYSICIAN ASSISTANT

## 2024-07-17 PROCEDURE — 87880 STREP A ASSAY W/OPTIC: CPT | Performed by: PHYSICIAN ASSISTANT

## 2024-07-17 PROCEDURE — 1126F AMNT PAIN NOTED NONE PRSNT: CPT | Performed by: PHYSICIAN ASSISTANT

## 2024-07-17 PROCEDURE — 3044F HG A1C LEVEL LT 7.0%: CPT | Performed by: PHYSICIAN ASSISTANT

## 2024-07-17 RX ORDER — ESCITALOPRAM OXALATE 5 MG/1
1 TABLET ORAL DAILY
COMMUNITY
Start: 2024-07-10

## 2024-07-17 RX ORDER — LIDOCAINE HYDROCHLORIDE 20 MG/ML
5 SOLUTION OROPHARYNGEAL 4 TIMES DAILY PRN
Qty: 250 ML | Refills: 0 | Status: SHIPPED | OUTPATIENT
Start: 2024-07-17

## 2024-07-17 RX ORDER — AMOXICILLIN AND CLAVULANATE POTASSIUM 875; 125 MG/1; MG/1
1 TABLET, FILM COATED ORAL 2 TIMES DAILY
Qty: 14 TABLET | Refills: 0 | Status: SHIPPED | OUTPATIENT
Start: 2024-07-17

## 2024-07-17 NOTE — PROGRESS NOTES
"Subjective   Evaristo Colvin is a 43 y.o. male.     Sore Throat        Pt presents with CC of sore throat along with white patches on tongue and back of throat X 2-3 days   Uvula is swollen (but down from initial size) with white \"gunk\"    No recent steroids or antibiotics   No inhaler therapy    Previous A1c 5 months ago was at 6.3%     No fever or chills       The following portions of the patient's history were reviewed and updated as appropriate: allergies, current medications, past family history, past medical history, past social history, past surgical history, and problem list.    Review of Systems   HENT:  Positive for sore throat.      As noted per HPI     Objective   Blood pressure 100/66, pulse 108, temperature 98.4 °F (36.9 °C), height 170.2 cm (67\"), weight 112 kg (248 lb), SpO2 98%.     Physical Exam  Vitals and nursing note reviewed.   Constitutional:       Appearance: Normal appearance.   HENT:      Head: Normocephalic.      Right Ear: Tympanic membrane, ear canal and external ear normal.      Left Ear: Tympanic membrane, ear canal and external ear normal.      Nose: Nose normal.      Mouth/Throat:      Pharynx: Oropharyngeal exudate and posterior oropharyngeal erythema present.     Eyes:      Conjunctiva/sclera: Conjunctivae normal.   Cardiovascular:      Rate and Rhythm: Normal rate and regular rhythm.   Pulmonary:      Effort: Pulmonary effort is normal.      Breath sounds: Normal breath sounds.   Skin:     General: Skin is warm and dry.   Neurological:      Mental Status: He is alert and oriented to person, place, and time.   Psychiatric:         Mood and Affect: Mood normal.         Behavior: Behavior normal.         Assessment & Plan   Diagnoses and all orders for this visit:    1. Uvulitis (Primary)  -     amoxicillin-clavulanate (AUGMENTIN) 875-125 MG per tablet; Take 1 tablet by mouth 2 (Two) Times a Day.  Dispense: 14 tablet; Refill: 0    2. Elevated hemoglobin A1c  -     POC " Glycosylated Hemoglobin (Hb A1C)    3. Acute pharyngitis, unspecified etiology  -     POCT rapid strep A    4. Type 2 diabetes mellitus without complication, without long-term current use of insulin    5. Oral thrush  -     nystatin (MYCOSTATIN) 100,000 unit/mL suspension; Swish and swallow 5 mL 4 (Four) Times a Day.  Dispense: 250 mL; Refill: 0  -     Lidocaine Viscous HCl (XYLOCAINE) 2 % solution; Take 5 mL by mouth 4 (Four) Times a Day As Needed for Mild Pain or Moderate Pain.  Dispense: 250 mL; Refill: 0    Treat uvulitis and suspected thrush as outlined in plan   Strep negative, pt aware.   Report to ER if new or worsening symptoms develop or if difficulty breathing   A1c now in Type 2 DM range at 6.6. patient needs to make significant lifestyle and diet changes. Follow up with PCP as directed. If BS values not improving, will need to start medication

## 2024-07-17 NOTE — TELEPHONE ENCOUNTER
Caller: Evaristo Colvin    Relationship: Self    Best call back number: [phone]     What medication are you requesting: ANTIBIOTIC     What are your current symptoms: SWOLLEN THROAT, WHITE TONGUE AND UVULA.     How long have you been experiencing symptoms: 3 DAYS     Have you had these symptoms before:    [x] Yes  [] No    Have you been treated for these symptoms before:   [x] Yes  [] No    If a prescription is needed, what is your preferred pharmacy and phone number: [id number] - [place], [place] - [address] [place] [address] AT [facility] [address] - [phone] [facility] [phone] FX     Additional notes:

## 2024-07-29 ENCOUNTER — OFFICE VISIT (OUTPATIENT)
Age: 44
End: 2024-07-29
Payer: COMMERCIAL

## 2024-07-29 ENCOUNTER — LAB (OUTPATIENT)
Facility: HOSPITAL | Age: 44
End: 2024-07-29
Payer: COMMERCIAL

## 2024-07-29 VITALS
OXYGEN SATURATION: 97 % | BODY MASS INDEX: 38.92 KG/M2 | SYSTOLIC BLOOD PRESSURE: 117 MMHG | HEIGHT: 67 IN | DIASTOLIC BLOOD PRESSURE: 78 MMHG | WEIGHT: 248 LBS | HEART RATE: 81 BPM

## 2024-07-29 DIAGNOSIS — E29.1 HYPOGONADISM MALE: ICD-10-CM

## 2024-07-29 PROCEDURE — 1159F MED LIST DOCD IN RCRD: CPT | Performed by: STUDENT IN AN ORGANIZED HEALTH CARE EDUCATION/TRAINING PROGRAM

## 2024-07-29 PROCEDURE — 99214 OFFICE O/P EST MOD 30 MIN: CPT | Performed by: STUDENT IN AN ORGANIZED HEALTH CARE EDUCATION/TRAINING PROGRAM

## 2024-07-29 PROCEDURE — 1160F RVW MEDS BY RX/DR IN RCRD: CPT | Performed by: STUDENT IN AN ORGANIZED HEALTH CARE EDUCATION/TRAINING PROGRAM

## 2024-07-29 PROCEDURE — 84403 ASSAY OF TOTAL TESTOSTERONE: CPT

## 2024-07-29 PROCEDURE — 36415 COLL VENOUS BLD VENIPUNCTURE: CPT

## 2024-07-29 PROCEDURE — 85014 HEMATOCRIT: CPT

## 2024-07-29 PROCEDURE — 85018 HEMOGLOBIN: CPT

## 2024-07-29 RX ORDER — TESTOSTERONE CYPIONATE 200 MG/ML
100 INJECTION, SOLUTION INTRAMUSCULAR WEEKLY
Qty: 4 ML | Refills: 3 | Status: SHIPPED | OUTPATIENT
Start: 2024-07-29

## 2024-07-29 NOTE — PROGRESS NOTES
"     Follow Up Office Visit      Patient Name: Evaristo Colvin  : 1980   MRN: [id number]     Chief Complaint:    Chief Complaint   Patient presents with    Hypogonadism       Referring Provider: No ref. provider found    History of Present Illness: Evaristo Colvin is a 43 y.o. male who presents today for follow up of testosterone deficiency. He is managing testosterone deficiency with T cypionate injections 100 mg weekly.     He is an active smoker. Currently using nicotine patches to quit.     He states he is only sleeping 3-4 hours max a night due to back pain issues. He has been using Ambien for this issue with some response.     Since initiating testosterone injections report increasing libido but no change in fatigue or energy.     Waited until today to get his labs drawn.  States his last injection was \"last week\".  Labs are now back.      No results found for: \"PSA\"  Due for PSA screening    Subjective      Review of System: Review of Systems   Constitutional:  Positive for fatigue.      I have reviewed the ROS documented by my clinical staff, I have updated appropriately and I agree. Red Taylor MD    I have reviewed and the following portions of the patient's history were updated as appropriate: past family history, past medical history, past social history, past surgical history and problem list.    Medications:     Current Outpatient Medications:     Alcohol Swabs pads, Clean area prior to injection, Disp: 100 each, Rfl: 11    amoxicillin-clavulanate (AUGMENTIN) 875-125 MG per tablet, Take 1 tablet by mouth 2 (Two) Times a Day., Disp: 14 tablet, Rfl: 0    buprenorphine-naloxone (SUBOXONE) 8-2 MG per SL tablet, 1 tablet Daily., Disp: , Rfl:     cyclobenzaprine (FLEXERIL) 10 MG tablet, Take 1 tablet by mouth 2 (Two) Times a Day As Needed for Muscle Spasms., Disp: 60 tablet, Rfl: 2    diclofenac (VOLTAREN) 50 MG EC tablet, Take 1 tablet by mouth 2 (Two) Times a Day., Disp: 60 " "tablet, Rfl: 2    escitalopram (LEXAPRO) 5 MG tablet, Take 1 tablet by mouth Daily., Disp: , Rfl:     fluticasone (FLONASE) 50 MCG/ACT nasal spray, SPRAY 2 SPRAYS INTO THE NOSTRIL AS DIRECTED BY PROVIDER DAILY., Disp: 16 mL, Rfl: 0    furosemide (Lasix) 40 MG tablet, Take 1 tablet by mouth 2 (Two) Times a Day., Disp: 60 tablet, Rfl: 2    lamoTRIgine (LaMICtal) 150 MG tablet, Take 1 tablet by mouth every night at bedtime., Disp: , Rfl:     Lidocaine Viscous HCl (XYLOCAINE) 2 % solution, Take 5 mL by mouth 4 (Four) Times a Day As Needed for Mild Pain or Moderate Pain., Disp: 250 mL, Rfl: 0    naloxone (NARCAN) 4 MG/0.1ML nasal spray, , Disp: , Rfl:     Needle, Disp, (Hypodermic Needle) 23G X 1\" misc, Use 1 Device As Needed (Injection of Testosterone)., Disp: 50 each, Rfl: 11    Needle, Disp, 18G X 1-1/2\" misc, Use for drawing up the medication, Disp: 50 each, Rfl: 3    nicotine (NICODERM CQ) 21 MG/24HR patch, PLACE 1 PATCH ON THE SKIN AS DIRECTED BY PROVIDER DAILY., Disp: 28 patch, Rfl: 1    nystatin (MYCOSTATIN) 100,000 unit/mL suspension, Swish and swallow 5 mL 4 (Four) Times a Day., Disp: 250 mL, Rfl: 0    omeprazole (priLOSEC) 20 MG capsule, Take 1 capsule by mouth Daily., Disp: 30 capsule, Rfl: 3    potassium chloride (KLOR-CON M20) 20 MEQ CR tablet, Take 1 tablet by mouth 2 (Two) Times a Day., Disp: 30 tablet, Rfl: 2    Syringe, Disposable, 3 ML misc, Use 1 syringe 1 (One) Time Per Week., Disp: 100 each, Rfl: 11    tadalafil (Cialis) 20 MG tablet, Take 1 tablet by mouth Daily As Needed for Erectile Dysfunction., Disp: 30 tablet, Rfl: 11    Testosterone Cypionate (DEPOTESTOTERONE CYPIONATE) 200 MG/ML injection, Inject 0.5 mL into the appropriate muscle as directed by prescriber 1 (One) Time Per Week., Disp: 4 mL, Rfl: 3    topiramate (TOPAMAX) 25 MG tablet, Take 1 tablet by mouth 2 (Two) Times a Day., Disp: 60 tablet, Rfl: 2    zolpidem (AMBIEN) 5 MG tablet, Take 1 tablet by mouth., Disp: , Rfl:     Allergies: " "  No Known Allergies    IPSS Questionnaire (AUA-7):  Over the past month…    1)  Incomplete Emptying:       How often have you had a sensation of not emptying you had the sensation of not emptying your bladder completely after you finished urinating?  0 - Not at all   2)  Frequency:       How often have you had the urinate again less than two hours after you finished urinating?  3 - About half the time   3)  Intermittency:       How often have you found you stopped and started again several times when you urinated?   1 - Less than 1 time in 5   4) Urgency:      How often have you found it difficult to postpone urination?  0 - Not at all   5) Weak Stream:      How often have you had a weak urinary stream?  1 - Less than 1 time in 5   6) Straining:       How often have you had to push or strain to begin urination?  0 - Not at all   7) Nocturia:      How many times did you most typically get up to urinate from the time you went to bed at night until the time you got up in the morning?  2 - 2 times   Total Score:  7   The International Prostate Symptom Score (IPSS) is used to screen, diagnose, track symptoms of benign prostatic hyperplasia (BPH).   0-7 (Mild Symptoms) 8-19 (Moderate) 20-35 (Severe)   Quality of Life (QoL):  If you were to spend the rest of your life with your urinary condition just the way it is now, how would you feel about that? 1-Pleased   Urine Leakage (Incontinence) 0-No Leakage     Objective     Physical Exam:   Vital Signs:   Vitals:    07/29/24 1429   BP: 117/78   BP Location: Right arm   Patient Position: Sitting   Cuff Size: Large Adult   Pulse: 81   SpO2: 97%   Weight: 112 kg (248 lb)   Height: 170.2 cm (67\")     Body mass index is 38.84 kg/m².     Physical Exam  Constitutional:       Appearance: Normal appearance.   HENT:      Head: Normocephalic and atraumatic.      Nose: Nose normal.   Eyes:      Extraocular Movements: Extraocular movements intact.      Conjunctiva/sclera: Conjunctivae " normal.      Pupils: Pupils are equal, round, and reactive to light.   Musculoskeletal:         General: Normal range of motion.      Cervical back: Normal range of motion and neck supple.   Skin:     General: Skin is warm and dry.      Findings: No lesion or rash.   Neurological:      General: No focal deficit present.      Mental Status: He is alert and oriented to person, place, and time. Mental status is at baseline.   Psychiatric:         Mood and Affect: Mood normal.         Behavior: Behavior normal.         Labs:   Brief Urine Lab Results  (Last result in the past 365 days)        Color   Clarity   Blood   Leuk Est   Nitrite   Protein   CREAT   Urine HCG        09/05/23 0859 Yellow   Clear   Negative   Negative   Negative   Negative                        Lab Results   Component Value Date    GLUCOSE 138 (H) 06/03/2024    CALCIUM 9.7 06/03/2024     06/03/2024    K 4.8 06/03/2024    CO2 24 06/03/2024     06/03/2024    BUN 12 06/03/2024    CREATININE 0.87 06/03/2024    BCR 14 06/03/2024    ANIONGAP 10.0 02/09/2024       Lab Results   Component Value Date    WBC 8.45 02/09/2024    HGB 15.6 02/09/2024    HCT 47.3 02/09/2024    MCV 89.1 02/09/2024     02/09/2024       Images:   XR Spine Lumbar 2 or 3 View    Result Date: 6/26/2024  No evidence of hardware loosening or failure. No acute osseous findings. Mild-to-moderate multilevel degenerative changes, similar to comparison. CRITICAL RESULT:   No. COMMUNICATION: Per this written report. By electronically signing this report, I, the attending physician, attest that I have personally reviewed the images/data for the above examination(s) and agree with the final edited report. Drafted by Nikolay Khan MD on 6/26/2024 9:04 AM Final report signed by Power Styles on 6/26/2024 10:38 AM      Measures:   Tobacco:   Evaristo Colvin  reports that he has been smoking cigarettes. He started smoking about 6 months ago. He has a 10.1 pack-year  smoking history. He has been exposed to tobacco smoke. He has never used smokeless tobacco.     Assessment / Plan      Assessment/Plan:   43 y.o. male who presented today for follow up of testosterone efficiency.  Awaiting testosterone and CBC labs.  Continue 100 mg weekly injections.  We discussed his fatigue and energy is likely multifactorial given his poor nighttime sleeping habits and ongoing smoking.  Recommend cessation.  He is working with nicotine patches to quit smoking.  Follow-up with PA as scheduled in 3 months with repeat labs including a PSA which he would need given he is older than 40 and on testosterone supplementation we would need to screen for prostate cancer.  He reports     Diagnoses and all orders for this visit:    1. Hypogonadism male  -     Testosterone Cypionate (DEPOTESTOTERONE CYPIONATE) 200 MG/ML injection; Inject 0.5 mL into the appropriate muscle as directed by prescriber 1 (One) Time Per Week.  Dispense: 4 mL; Refill: 3  -     CBC (No Diff); Future  -     Testosterone, Free, Total; Future  -     PSA Diagnostic; Future           Follow Up:   Return in about 3 months (around 10/29/2024) for F/u STUART Sheehan.    I spent approximately 30 minutes providing clinical care for this patient; including review of patient's chart and provider documentation, face to face time spent with patient in examination room (obtaining history, performing physical exam, discussing diagnosis and management options), placing orders, and completing patient documentation.     Red Taylor MD  [facility] [place]

## 2024-07-30 LAB
HCT VFR BLD AUTO: 53.1 % (ref 37.5–51)
HGB BLD-MCNC: 17.1 G/DL (ref 13–17.7)
TESTOST SERPL-MCNC: 969 NG/DL (ref 249–836)

## 2024-08-05 ENCOUNTER — TELEPHONE (OUTPATIENT)
Dept: FAMILY MEDICINE CLINIC | Facility: CLINIC | Age: 44
End: 2024-08-05
Payer: COMMERCIAL

## 2024-08-05 NOTE — TELEPHONE ENCOUNTER
Caller: Evaristo Colvin    Relationship: Self    Best call back number:     What is the medical concern/diagnosis: EAR PAIN     What specialty or service is being requested: EAR NOSE AND THROAT    What is the provider, practice or medical service name: NA    What is the office location: NA    What is the office phone number: NA    Any additional details: ENT OFFICE NEVER RECEIVED THE REFERRAL THAT WAS SENT ON 08/01/2024. PLEASE RESEND REFERRAL FOR PATIENT TO SCHEDULE.

## 2024-08-05 NOTE — TELEPHONE ENCOUNTER
Contacted Dr Ivy office, they stated they still didn't receive it. I have re fax it again and put attn Meghann. Informed patient

## 2024-08-08 DIAGNOSIS — E29.1 HYPOGONADISM MALE: Primary | ICD-10-CM

## 2024-08-20 ENCOUNTER — LAB (OUTPATIENT)
Dept: LAB | Facility: HOSPITAL | Age: 44
End: 2024-08-20
Payer: COMMERCIAL

## 2024-08-30 ENCOUNTER — TELEPHONE (OUTPATIENT)
Dept: UROLOGY | Facility: CLINIC | Age: 44
End: 2024-08-30

## 2024-08-30 NOTE — TELEPHONE ENCOUNTER
"  Caller: VANE DELGADO    Relationship: SELF    Best call back number: [phone]    What is your medical concern? INCOMING CALL FROM PT. PT SAYS HE WENT TO LAB LAST WEEK AND WAS TOLD HIS ORDER FOR \"Therapeutic Phlebotomy Lab\" WAS PRINTED WRONG. PT IS REQUESTING HELP SAYS HE HAS BEEN TO THREE DIFFERENT LABS AND NO ONE CAN ASSIST HIM.    How long has this issue been going on? 3 WEEKS    Is your provider already aware of this issue? NO    Have you been treated for this issue? NO      "

## 2024-09-05 ENCOUNTER — OFFICE VISIT (OUTPATIENT)
Dept: FAMILY MEDICINE CLINIC | Facility: CLINIC | Age: 44
End: 2024-09-05
Payer: COMMERCIAL

## 2024-09-05 VITALS
OXYGEN SATURATION: 98 % | WEIGHT: 239.8 LBS | SYSTOLIC BLOOD PRESSURE: 140 MMHG | TEMPERATURE: 97.5 F | DIASTOLIC BLOOD PRESSURE: 85 MMHG | BODY MASS INDEX: 37.64 KG/M2 | HEART RATE: 105 BPM | RESPIRATION RATE: 20 BRPM | HEIGHT: 67 IN

## 2024-09-05 DIAGNOSIS — I87.8 VENOUS STASIS: ICD-10-CM

## 2024-09-05 DIAGNOSIS — M54.50 CHRONIC MIDLINE LOW BACK PAIN WITHOUT SCIATICA: ICD-10-CM

## 2024-09-05 DIAGNOSIS — R73.03 PREDIABETES: Primary | ICD-10-CM

## 2024-09-05 DIAGNOSIS — G89.29 CHRONIC MIDLINE LOW BACK PAIN WITHOUT SCIATICA: ICD-10-CM

## 2024-09-05 DIAGNOSIS — R60.0 BILATERAL LEG EDEMA: ICD-10-CM

## 2024-09-05 DIAGNOSIS — Z79.899 LONG TERM CURRENT USE OF DIURETIC: ICD-10-CM

## 2024-09-05 LAB
EXPIRATION DATE: NORMAL
Lab: NORMAL
POC CREATININE URINE: NORMAL
POC MICROALBUMIN URINE: NORMAL

## 2024-09-05 PROCEDURE — 82044 UR ALBUMIN SEMIQUANTITATIVE: CPT | Performed by: FAMILY MEDICINE

## 2024-09-05 PROCEDURE — 3044F HG A1C LEVEL LT 7.0%: CPT | Performed by: FAMILY MEDICINE

## 2024-09-05 PROCEDURE — 99213 OFFICE O/P EST LOW 20 MIN: CPT | Performed by: FAMILY MEDICINE

## 2024-09-05 PROCEDURE — 1126F AMNT PAIN NOTED NONE PRSNT: CPT | Performed by: FAMILY MEDICINE

## 2024-09-05 RX ORDER — POTASSIUM CHLORIDE 1500 MG/1
20 TABLET, EXTENDED RELEASE ORAL 2 TIMES DAILY
Qty: 30 TABLET | Refills: 2 | Status: SHIPPED | OUTPATIENT
Start: 2024-09-05

## 2024-09-05 RX ORDER — ZOLPIDEM TARTRATE 10 MG/1
10 TABLET ORAL
COMMUNITY
Start: 2024-08-22

## 2024-09-05 RX ORDER — FUROSEMIDE 40 MG
40 TABLET ORAL 2 TIMES DAILY
Qty: 60 TABLET | Refills: 2 | Status: SHIPPED | OUTPATIENT
Start: 2024-09-05

## 2024-09-05 NOTE — PROGRESS NOTES
"Chief Complaint   Patient presents with    Follow-up    Leg Swelling     BLE edema    Weight Gain       Subjective      Evaristo Colvin is a 43 y.o. who presents for follow-up of venous stasis, obesity, and possible diabetes.  At patient's last visit in this office he had an A1c done which was slightly elevated at 6.6 with past assessments more consistent with prediabetes and no glucoses in a diabetic range    The following portions of the patient's history were reviewed and updated as appropriate: allergies, current medications, past family history, past medical history, past social history, past surgical history, and problem list.    Review of Systems    Objective   Vital Signs:  /85   Pulse 105   Temp 97.5 °F (36.4 °C)   Resp 20   Ht 170.2 cm (67\")   Wt 109 kg (239 lb 12.8 oz)   SpO2 98%   BMI 37.56 kg/m²               Physical Exam  Vitals reviewed.   Constitutional:       Appearance: Normal appearance.   Cardiovascular:      Rate and Rhythm: Normal rate and regular rhythm.      Pulses: Normal pulses.      Heart sounds: Normal heart sounds.   Pulmonary:      Effort: Pulmonary effort is normal.      Breath sounds: Normal breath sounds.   Neurological:      Mental Status: He is alert.          Result Review                     Assessment and Plan  Diagnoses and all orders for this visit:    1. Prediabetes (Primary)  -     POCT microalbumin    2. Long term current use of diuretic  -     potassium chloride (KLOR-CON M20) 20 MEQ CR tablet; Take 1 tablet by mouth 2 (Two) Times a Day.  Dispense: 30 tablet; Refill: 2    3. Venous stasis  Comments:  Improved.  Continue Lasix 40 mg once daily with second dose available if needed.  Add potassium supplement.  Reassess in 2 months  Orders:  -     furosemide (Lasix) 40 MG tablet; Take 1 tablet by mouth 2 (Two) Times a Day.  Dispense: 60 tablet; Refill: 2    4. Bilateral leg edema  -     furosemide (Lasix) 40 MG tablet; Take 1 tablet by mouth 2 (Two) Times " a Day.  Dispense: 60 tablet; Refill: 2    5. Chronic midline low back pain without sciatica  -     diclofenac (VOLTAREN) 50 MG EC tablet; Take 1 tablet by mouth 2 (Two) Times a Day.  Dispense: 60 tablet; Refill: 2    Plan  1.  Patient blood sugars are definitely in the prediabetes range.  He has implemented dietary changes and we will allow these to continue and reassess A1c and random glucose at his follow-up visit  2.  For his venous stasis which is also improving he will continue furosemide twice daily    Patient has been erroneously marked as diabetic. Based on the available clinical information, he does not have diabetes and should therefore be excluded from diabetic health maintenance and quality measures for the remainder of the reporting period.          Follow Up  Return in about 3 months (around 12/5/2024) for Next scheduled follow up Prediabetes, Edema, Next scheduled follow up.  Patient was given instructions and counseling regarding his condition or for health maintenance advice. Please see specific information pulled into the AVS if appropriate.

## 2024-09-18 DIAGNOSIS — M54.42 CHRONIC BILATERAL LOW BACK PAIN WITH LEFT-SIDED SCIATICA: ICD-10-CM

## 2024-09-18 DIAGNOSIS — G89.29 CHRONIC BILATERAL LOW BACK PAIN WITH LEFT-SIDED SCIATICA: ICD-10-CM

## 2024-09-18 RX ORDER — CYCLOBENZAPRINE HCL 10 MG
10 TABLET ORAL 2 TIMES DAILY PRN
Qty: 60 TABLET | Refills: 2 | Status: SHIPPED | OUTPATIENT
Start: 2024-09-18

## 2024-09-26 DIAGNOSIS — E66.01 CLASS 2 SEVERE OBESITY DUE TO EXCESS CALORIES WITH SERIOUS COMORBIDITY AND BODY MASS INDEX (BMI) OF 37.0 TO 37.9 IN ADULT: ICD-10-CM

## 2024-09-26 RX ORDER — TOPIRAMATE 25 MG/1
25 TABLET, FILM COATED ORAL 2 TIMES DAILY
Qty: 60 TABLET | Refills: 2 | Status: SHIPPED | OUTPATIENT
Start: 2024-09-26

## 2024-10-22 ENCOUNTER — LAB (OUTPATIENT)
Dept: LAB | Facility: HOSPITAL | Age: 44
End: 2024-10-22
Payer: COMMERCIAL

## 2024-10-22 ENCOUNTER — TELEPHONE (OUTPATIENT)
Dept: UROLOGY | Facility: CLINIC | Age: 44
End: 2024-10-22
Payer: COMMERCIAL

## 2024-10-22 DIAGNOSIS — E29.1 HYPOGONADISM MALE: ICD-10-CM

## 2024-10-22 LAB
DEPRECATED RDW RBC AUTO: 45.5 FL (ref 37–54)
ERYTHROCYTE [DISTWIDTH] IN BLOOD BY AUTOMATED COUNT: 14.5 % (ref 12.3–15.4)
HCT VFR BLD AUTO: 50.7 % (ref 37.5–51)
HGB BLD-MCNC: 16.9 G/DL (ref 13–17.7)
MCH RBC QN AUTO: 29.2 PG (ref 26.6–33)
MCHC RBC AUTO-ENTMCNC: 33.3 G/DL (ref 31.5–35.7)
MCV RBC AUTO: 87.6 FL (ref 79–97)
PLATELET # BLD AUTO: 228 10*3/MM3 (ref 140–450)
PMV BLD AUTO: 10.7 FL (ref 6–12)
RBC # BLD AUTO: 5.79 10*6/MM3 (ref 4.14–5.8)
WBC NRBC COR # BLD AUTO: 11.05 10*3/MM3 (ref 3.4–10.8)

## 2024-10-22 PROCEDURE — 84403 ASSAY OF TOTAL TESTOSTERONE: CPT

## 2024-10-22 PROCEDURE — 84402 ASSAY OF FREE TESTOSTERONE: CPT

## 2024-10-22 PROCEDURE — 85027 COMPLETE CBC AUTOMATED: CPT

## 2024-10-22 PROCEDURE — 36415 COLL VENOUS BLD VENIPUNCTURE: CPT

## 2024-10-22 PROCEDURE — 84153 ASSAY OF PSA TOTAL: CPT

## 2024-10-22 NOTE — TELEPHONE ENCOUNTER
Caller: Evaristo Colvin    Relationship to patient: Self    Best call back number: [phone]    Chief complaint: LOW TESTOSTERONE    Type of visit: 3 MTH FOLLOW UP    Requested date:     If rescheduling, when is the original appointment: 10/30/24     Additional notes:PT IS STARTING NEW JOB AND NEEDS TO RESCHEDULE APPT.  NO AVAILABILITY UNTIL 12/27/24- PT HAS REQUESTED TO BE RESCHEDULE SOONER THAN DECEMBER.    PLEASE CALL TO RESCHEDULE. SPOKE TO , NO AVAILABILITY WITHIN SPECIFIED TIMEFRAME.

## 2024-10-22 NOTE — TELEPHONE ENCOUNTER
Called and spoke with patient. PT states trying to get labs done today. R/S appt to Thursday, but informed patient if labs wasn't done today, to call us back and we would r/s for later day, if anything we could possibly do a telehealth visit due to pt starting a new job. PT states he understood.

## 2024-10-23 LAB — PSA SERPL-MCNC: 1.1 NG/ML (ref 0–4)

## 2024-10-24 ENCOUNTER — OFFICE VISIT (OUTPATIENT)
Dept: UROLOGY | Facility: CLINIC | Age: 44
End: 2024-10-24
Payer: COMMERCIAL

## 2024-10-24 VITALS — HEART RATE: 75 BPM | SYSTOLIC BLOOD PRESSURE: 134 MMHG | DIASTOLIC BLOOD PRESSURE: 83 MMHG | OXYGEN SATURATION: 94 %

## 2024-10-24 DIAGNOSIS — E29.1 HYPOGONADISM MALE: Primary | ICD-10-CM

## 2024-10-24 NOTE — PROGRESS NOTES
"     Follow Up Office Visit      Patient Name: Evaristo Colvin  : 1980   MRN: [id number]     Chief Complaint:    Chief Complaint   Patient presents with    Hypogonadism       Referring Provider: No ref. provider found    History of Present Illness: Evaristo Colvin is a 43 y.o. male who presents today for follow up of Low Testosterone. He is able to obtain/maintain erections without Cialis now. He denies significant improvement in fatigue with Testosterone replacement.     Labs  10/22/24  Total Testosterone; 1014  Hematocrit; 50.7.    IPSS 14, he reports urinary symptoms are due to his Lasix.  He is not bothered by his urinary symptoms.  Subjective      Review of System: Review of Systems   Constitutional:  Positive for fatigue.   All other systems reviewed and are negative.     I have reviewed the ROS documented by my clinical staff, I have updated appropriately and I agree. OLIMPIA Nicolas    I have reviewed and the following portions of the patient's history were updated as appropriate: past family history, past medical history, past social history, past surgical history and problem list.    Medications:     Current Outpatient Medications:     Alcohol Swabs pads, Clean area prior to injection, Disp: 100 each, Rfl: 11    buprenorphine-naloxone (SUBOXONE) 8-2 MG per SL tablet, 1 tablet Daily., Disp: , Rfl:     cyclobenzaprine (FLEXERIL) 10 MG tablet, Take 1 tablet by mouth 2 (Two) Times a Day As Needed for Muscle Spasms., Disp: 60 tablet, Rfl: 2    diclofenac (VOLTAREN) 50 MG EC tablet, Take 1 tablet by mouth 2 (Two) Times a Day., Disp: 60 tablet, Rfl: 2    furosemide (Lasix) 40 MG tablet, Take 1 tablet by mouth 2 (Two) Times a Day., Disp: 60 tablet, Rfl: 2    lamoTRIgine (LaMICtal) 150 MG tablet, Take 1 tablet by mouth every night at bedtime., Disp: , Rfl:     naloxone (NARCAN) 4 MG/0.1ML nasal spray, , Disp: , Rfl:     Needle, Disp, (Hypodermic Needle) 23G X 1\" misc, Use 1 Device As Needed " "(Injection of Testosterone)., Disp: 50 each, Rfl: 11    Needle, Disp, 18G X 1-1/2\" misc, Use for drawing up the medication, Disp: 50 each, Rfl: 3    omeprazole (priLOSEC) 20 MG capsule, Take 1 capsule by mouth Daily., Disp: 30 capsule, Rfl: 3    potassium chloride (KLOR-CON M20) 20 MEQ CR tablet, Take 1 tablet by mouth 2 (Two) Times a Day., Disp: 30 tablet, Rfl: 2    Syringe, Disposable, 3 ML misc, Use 1 syringe 1 (One) Time Per Week., Disp: 100 each, Rfl: 11    tadalafil (Cialis) 20 MG tablet, Take 1 tablet by mouth Daily As Needed for Erectile Dysfunction., Disp: 30 tablet, Rfl: 11    Testosterone Cypionate (DEPOTESTOTERONE CYPIONATE) 200 MG/ML injection, Inject 0.5 mL into the appropriate muscle as directed by prescriber 1 (One) Time Per Week., Disp: 4 mL, Rfl: 3    topiramate (TOPAMAX) 25 MG tablet, TAKE 1 TABLET BY MOUTH TWICE A DAY, Disp: 60 tablet, Rfl: 2    zolpidem (AMBIEN) 10 MG tablet, Take 1 tablet by mouth every night at bedtime., Disp: , Rfl:     Allergies:   No Known Allergies    IPSS Questionnaire (AUA-7):  Over the past month…    1)  Incomplete Emptying:       How often have you had a sensation of not emptying you had the sensation of not emptying your bladder completely after you finished urinating?  3 - About half the time   2)  Frequency:       How often have you had the urinate again less than two hours after you finished urinating?  4 - More than half the time   3)  Intermittency:       How often have you found you stopped and started again several times when you urinated?   1 - Less than 1 time in 5   4) Urgency:      How often have you found it difficult to postpone urination?  4 - More than half the time   5) Weak Stream:      How often have you had a weak urinary stream?  0 - Not at all   6) Straining:       How often have you had to push or strain to begin urination?  0 - Not at all   7) Nocturia:      How many times did you most typically get up to urinate from the time you went to bed at " night until the time you got up in the morning?  2 - 2 times   Total Score:  14   The International Prostate Symptom Score (IPSS) is used to screen, diagnose, track symptoms of benign prostatic hyperplasia (BPH).   0-7 (Mild Symptoms) 8-19 (Moderate) 20-35 (Severe)   Quality of Life (QoL):  If you were to spend the rest of your life with your urinary condition just the way it is now, how would you feel about that? 1-Pleased   Urine Leakage (Incontinence) 0-No Leakage       Objective     Physical Exam:   Vital Signs:   Vitals:    10/24/24 1455   BP: 134/83   Pulse: 75   SpO2: 94%     There is no height or weight on file to calculate BMI.     Physical Exam  Vitals and nursing note reviewed.   Constitutional:       Appearance: Normal appearance.   HENT:      Head: Normocephalic and atraumatic.      Nose: Nose normal.      Mouth/Throat:      Mouth: Mucous membranes are moist.   Eyes:      Pupils: Pupils are equal, round, and reactive to light.   Pulmonary:      Effort: Pulmonary effort is normal.   Abdominal:      General: Abdomen is flat.      Palpations: Abdomen is soft.   Musculoskeletal:         General: Normal range of motion.      Cervical back: Normal range of motion.   Skin:     General: Skin is warm and dry.      Capillary Refill: Capillary refill takes less than 2 seconds.   Neurological:      General: No focal deficit present.      Mental Status: He is alert.   Psychiatric:         Mood and Affect: Mood normal.       Labs:   Brief Urine Lab Results  (Last result in the past 365 days)        Color   Clarity   Blood   Leuk Est   Nitrite   Protein   CREAT   Urine HCG        09/05/24 1641             300 mg/dL  Comment: A:C <30 mg/g  normal                      Lab Results   Component Value Date    GLUCOSE 138 (H) 06/03/2024    CALCIUM 9.7 06/03/2024     06/03/2024    K 4.8 06/03/2024    CO2 24 06/03/2024     06/03/2024    BUN 12 06/03/2024    CREATININE 0.87 06/03/2024    BCR 14 06/03/2024     ANIONGAP 10.0 02/09/2024       Lab Results   Component Value Date    WBC 11.05 (H) 10/22/2024    HGB 16.9 10/22/2024    HCT 50.7 10/22/2024    MCV 87.6 10/22/2024     10/22/2024       Images:   XR Hip With or Without Pelvis 2 - 3 View Right    Result Date: 8/13/2024  Mild degenerative changes of the bilateral hip joints. Bilateral CAM deformities suggesting femoroacetabular impingement. No acute osseous finding. CRITICAL RESULT:   No. COMMUNICATION: Per this written report. Drafted by Shagufta Cuba MD on 8/13/2024 9:16 AM Final report signed by Shagufta Cuba MD on 8/13/2024 9:19 AM      Measures:   Tobacco:   Evaristo Colvin  reports that he has been smoking cigarettes. He started smoking about 9 months ago. He has a 10.2 pack-year smoking history. He has been exposed to tobacco smoke. He has never used smokeless tobacco.     I advised him to quit.             Urine Incontinence: Patient reports that he is not currently experiencing any symptoms of urinary incontinence.     Assessment / Plan      Assessment/Plan:   43 y.o. male who presented today for follow up of low testosterone.  We discussed his lab values.  If testosterone remains elevated at next office visit we will plan to decrease dose of testosterone cypionate.  He will follow-up in 3 months with labs prior.    Diagnoses and all orders for this visit:    1. Hypogonadism male (Primary)  -     CBC (No Diff); Future  -     Testosterone; Future           Follow Up:   Return in about 3 months (around 1/24/2025) for Labs prior.    I spent approximately 15 minutes providing clinical care for this patient; including review of patient's chart and provider documentation, face to face time spent with patient in examination room (obtaining history, performing physical exam, discussing diagnosis and management options), placing orders, and completing patient documentation.     OLIMPIA Nicolas  [facility] [place]

## 2024-10-26 DIAGNOSIS — K21.9 GASTROESOPHAGEAL REFLUX DISEASE, UNSPECIFIED WHETHER ESOPHAGITIS PRESENT: ICD-10-CM

## 2024-10-28 LAB
TESTOST FREE SERPL-MCNC: 19.8 PG/ML (ref 6.8–21.5)
TESTOST SERPL-MCNC: 1014 NG/DL (ref 264–916)

## 2024-12-06 DIAGNOSIS — E29.1 HYPOGONADISM MALE: ICD-10-CM

## 2024-12-06 NOTE — TELEPHONE ENCOUNTER
Rx Refill Note  Requested Prescriptions     Pending Prescriptions Disp Refills    Testosterone Cypionate (DEPOTESTOTERONE CYPIONATE) 200 MG/ML injection [Pharmacy Med Name: TESTOSTERONE  MG/ML] 4 mL 3     Sig: INJECT 0.5 ML INTO THE APPROPRIATE MUSCLE AS DIRECTED BY PRESCRIBER 1 (ONE) TIME PER WEEK.      Last office visit with prescribing clinician: 10/24/24  Last telemedicine visit with prescribing clinician: Visit date not found   Next office visit with prescribing clinician: 2/12/2025    Tahira Webster MA  12/06/24, 16:07 EST

## 2024-12-07 RX ORDER — TESTOSTERONE CYPIONATE 200 MG/ML
100 INJECTION, SOLUTION INTRAMUSCULAR WEEKLY
Qty: 4 ML | Refills: 3 | Status: SHIPPED | OUTPATIENT
Start: 2024-12-07

## 2024-12-12 DIAGNOSIS — Z79.899 LONG TERM CURRENT USE OF DIURETIC: ICD-10-CM

## 2024-12-12 RX ORDER — POTASSIUM CHLORIDE 1500 MG/1
20 TABLET, EXTENDED RELEASE ORAL 2 TIMES DAILY
Qty: 30 TABLET | Refills: 2 | Status: SHIPPED | OUTPATIENT
Start: 2024-12-12

## 2024-12-12 NOTE — TELEPHONE ENCOUNTER
Caller: ViniciusEvaristo caballero    Relationship: Self    Best call back number: [phone]    Requested Prescriptions:   Requested Prescriptions     Pending Prescriptions Disp Refills    potassium chloride (KLOR-CON M20) 20 MEQ CR tablet 30 tablet 2     Sig: Take 1 tablet by mouth 2 (Two) Times a Day.        Pharmacy where request should be sent: [facility]ACY #[id number] - [address] AT [place] [address] - [phone] [facility] [phone] FX     Last office visit with prescribing clinician: 9/5/2024   Last telemedicine visit with prescribing clinician: Visit date not found   Next office visit with prescribing clinician: Visit date not found     Additional details provided by patient: THE PATIENT STATES THAT HE GETS 30 PILLS A MONTH BUT HE TAKES TWO PILLS A DAY THE MEDICATION ONLY LASTS 15 DAYS HE WOULD LIKE TO KNOW IF HE CAN GET A 30 DAY SUPPLY CALLED IN     Does the patient have less than a 3 day supply:  [x] Yes  [] No    Would you like a call back once the refill request has been completed: [] Yes [x] No    If the office needs to give you a call back, can they leave a voicemail: [] Yes [x] No    Alejandra Jiang Rep   12/12/24 09:30 EST

## 2025-02-20 ENCOUNTER — LAB (OUTPATIENT)
Dept: LAB | Facility: HOSPITAL | Age: 45
End: 2025-02-20
Payer: COMMERCIAL

## 2025-02-20 DIAGNOSIS — E29.1 HYPOGONADISM MALE: ICD-10-CM

## 2025-02-20 LAB
DEPRECATED RDW RBC AUTO: 43 FL (ref 37–54)
ERYTHROCYTE [DISTWIDTH] IN BLOOD BY AUTOMATED COUNT: 13 % (ref 12.3–15.4)
HCT VFR BLD AUTO: 50.4 % (ref 37.5–51)
HGB BLD-MCNC: 16.3 G/DL (ref 13–17.7)
MCH RBC QN AUTO: 29.5 PG (ref 26.6–33)
MCHC RBC AUTO-ENTMCNC: 32.3 G/DL (ref 31.5–35.7)
MCV RBC AUTO: 91.1 FL (ref 79–97)
PLATELET # BLD AUTO: 231 10*3/MM3 (ref 140–450)
PMV BLD AUTO: 10.7 FL (ref 6–12)
RBC # BLD AUTO: 5.53 10*6/MM3 (ref 4.14–5.8)
TESTOST SERPL-MCNC: 575 NG/DL (ref 249–836)
WBC NRBC COR # BLD AUTO: 10.18 10*3/MM3 (ref 3.4–10.8)

## 2025-02-20 PROCEDURE — 85027 COMPLETE CBC AUTOMATED: CPT

## 2025-02-20 PROCEDURE — 84403 ASSAY OF TOTAL TESTOSTERONE: CPT

## 2025-02-20 PROCEDURE — 36415 COLL VENOUS BLD VENIPUNCTURE: CPT

## 2025-05-04 DIAGNOSIS — K21.9 GASTROESOPHAGEAL REFLUX DISEASE, UNSPECIFIED WHETHER ESOPHAGITIS PRESENT: ICD-10-CM

## 2025-05-06 RX ORDER — OMEPRAZOLE 20 MG/1
20 CAPSULE, DELAYED RELEASE ORAL DAILY
Qty: 90 CAPSULE | Refills: 0 | Status: SHIPPED | OUTPATIENT
Start: 2025-05-06

## 2025-05-22 ENCOUNTER — OFFICE VISIT (OUTPATIENT)
Dept: FAMILY MEDICINE CLINIC | Facility: CLINIC | Age: 45
End: 2025-05-22
Payer: COMMERCIAL

## 2025-05-22 VITALS
HEIGHT: 67 IN | WEIGHT: 226.8 LBS | TEMPERATURE: 97.5 F | HEART RATE: 96 BPM | RESPIRATION RATE: 16 BRPM | BODY MASS INDEX: 35.6 KG/M2 | DIASTOLIC BLOOD PRESSURE: 78 MMHG | OXYGEN SATURATION: 92 % | SYSTOLIC BLOOD PRESSURE: 112 MMHG

## 2025-05-22 DIAGNOSIS — G89.29 CHRONIC BILATERAL LOW BACK PAIN WITH LEFT-SIDED SCIATICA: Primary | ICD-10-CM

## 2025-05-22 DIAGNOSIS — I87.8 VENOUS STASIS: ICD-10-CM

## 2025-05-22 DIAGNOSIS — Z79.899 LONG TERM CURRENT USE OF DIURETIC: ICD-10-CM

## 2025-05-22 DIAGNOSIS — R61 HYPERHIDROSIS: ICD-10-CM

## 2025-05-22 DIAGNOSIS — K21.9 GASTROESOPHAGEAL REFLUX DISEASE, UNSPECIFIED WHETHER ESOPHAGITIS PRESENT: ICD-10-CM

## 2025-05-22 DIAGNOSIS — E66.812 CLASS 2 SEVERE OBESITY DUE TO EXCESS CALORIES WITH SERIOUS COMORBIDITY AND BODY MASS INDEX (BMI) OF 37.0 TO 37.9 IN ADULT: ICD-10-CM

## 2025-05-22 DIAGNOSIS — M54.42 CHRONIC BILATERAL LOW BACK PAIN WITH LEFT-SIDED SCIATICA: Primary | ICD-10-CM

## 2025-05-22 DIAGNOSIS — E66.01 CLASS 2 SEVERE OBESITY DUE TO EXCESS CALORIES WITH SERIOUS COMORBIDITY AND BODY MASS INDEX (BMI) OF 37.0 TO 37.9 IN ADULT: ICD-10-CM

## 2025-05-22 DIAGNOSIS — R60.0 BILATERAL LEG EDEMA: ICD-10-CM

## 2025-05-22 RX ORDER — FUROSEMIDE 40 MG/1
40 TABLET ORAL 2 TIMES DAILY
Qty: 60 TABLET | Refills: 5 | Status: SHIPPED | OUTPATIENT
Start: 2025-05-22

## 2025-05-22 RX ORDER — OMEPRAZOLE 20 MG/1
20 CAPSULE, DELAYED RELEASE ORAL DAILY
Qty: 90 CAPSULE | Refills: 5 | Status: SHIPPED | OUTPATIENT
Start: 2025-05-22

## 2025-05-22 RX ORDER — POTASSIUM CHLORIDE 1500 MG/1
20 TABLET, EXTENDED RELEASE ORAL 2 TIMES DAILY
Qty: 30 TABLET | Refills: 5 | Status: SHIPPED | OUTPATIENT
Start: 2025-05-22

## 2025-05-22 RX ORDER — CYCLOBENZAPRINE HCL 10 MG
10 TABLET ORAL 2 TIMES DAILY PRN
Qty: 60 TABLET | Refills: 5 | Status: SHIPPED | OUTPATIENT
Start: 2025-05-22

## 2025-05-22 NOTE — PROGRESS NOTES
"Chief Complaint   Patient presents with    Med Refill    bruising & swelling     Lower extremities        Subjective      Evaristo Colvin is a 44 y.o. who presents for chronic care.  He has concerns about excessive sweating throughout the day and night.  He describes an instance where he had to change shirts 5 times in a single workday due to the amount of sweating that was occurring.  Patient uses Suboxone for substance use disorder and notes that his sweating did increase when beginning the medication nearly 2 years ago.  This was near simultaneous in onset with the acute rise in his weight.  Weight is down 20 pounds in over a year.  Regarding his chronic lower extremity edema from venous stasis that is stable and responds well to compression socks and furosemide.  GERD remains well-controlled with omeprazole.  Back pain remains tolerable with use of diclofenac    The following portions of the patient's history were reviewed and updated as appropriate: allergies, current medications, past family history, past medical history, past social history, past surgical history, and problem list.    Review of Systems    Objective   Vital Signs:  /78   Pulse 96   Temp 97.5 °F (36.4 °C)   Resp 16   Ht 170.2 cm (67\")   Wt 103 kg (226 lb 12.8 oz)   SpO2 92%   BMI 35.52 kg/m²             Physical Exam     Result Review                     Assessment and Plan  Diagnoses and all orders for this visit:    1. Hyperhidrosis (Primary)  Comments:  Complaint.  Likely medication related.  Labs will be assessed  Orders:  -     TSH Rfx On Abnormal To Free T4  -     C-reactive Protein  -     CBC (No Diff)  -     Comprehensive Metabolic Panel    2. Chronic bilateral low back pain with left-sided sciatica  Comments:  Stable.  Continue diclofenac.  Reassess in 5 to 6 months  Orders:  -     cyclobenzaprine (FLEXERIL) 10 MG tablet; Take 1 tablet by mouth 2 (Two) Times a Day As Needed for Muscle Spasms.  Dispense: 60 tablet; " Refill: 5  -     diclofenac (VOLTAREN) 50 MG EC tablet; Take 1 tablet by mouth 2 (Two) Times a Day.  Dispense: 60 tablet; Refill: 5    3. Gastroesophageal reflux disease, unspecified whether esophagitis present  Comments:  Stable with continued PPI use.  Omeprazole refilled  Orders:  -     omeprazole (priLOSEC) 20 MG capsule; Take 1 capsule by mouth Daily.  Dispense: 90 capsule; Refill: 5    4. Venous stasis  Comments:  Improved.  Continue Lasix 40 mg once daily with second dose available if needed.  Add potassium supplement.  Assess in 5 months  Orders:  -     furosemide (Lasix) 40 MG tablet; Take 1 tablet by mouth 2 (Two) Times a Day.  Dispense: 60 tablet; Refill: 5    5. Bilateral leg edema  -     furosemide (Lasix) 40 MG tablet; Take 1 tablet by mouth 2 (Two) Times a Day.  Dispense: 60 tablet; Refill: 5  -     CBC (No Diff)  -     Comprehensive Metabolic Panel    6. Long term current use of diuretic  -     potassium chloride (KLOR-CON M20) 20 MEQ CR tablet; Take 1 tablet by mouth 2 (Two) Times a Day.  Dispense: 30 tablet; Refill: 5  -     Comprehensive Metabolic Panel    7. Class 2 severe obesity due to excess calories with serious comorbidity and body mass index (BMI) of 37.0 to 37.9 in adult  Comments:  ImProving.  No longer on topiramate.  Assess at each visit      Patient has been erroneously marked as diabetic. Based on the available clinical information, he does not have diabetes and should therefore be excluded from diabetic health maintenance and quality measures for the remainder of the reporting period.        Follow Up  Return in about 5 months (around 10/22/2025) for Annual.  Patient was given instructions and counseling regarding his condition or for health maintenance advice. Please see specific information pulled into the AVS if appropriate.

## 2025-05-23 LAB
ALBUMIN SERPL-MCNC: 4.6 G/DL (ref 4.1–5.1)
ALP SERPL-CCNC: 123 IU/L (ref 44–121)
ALT SERPL-CCNC: 36 IU/L (ref 0–44)
AST SERPL-CCNC: 45 IU/L (ref 0–40)
BILIRUB SERPL-MCNC: 0.6 MG/DL (ref 0–1.2)
BUN SERPL-MCNC: 14 MG/DL (ref 6–24)
BUN/CREAT SERPL: 13 (ref 9–20)
CALCIUM SERPL-MCNC: 9.3 MG/DL (ref 8.7–10.2)
CHLORIDE SERPL-SCNC: 102 MMOL/L (ref 96–106)
CO2 SERPL-SCNC: 24 MMOL/L (ref 20–29)
CREAT SERPL-MCNC: 1.11 MG/DL (ref 0.76–1.27)
CRP SERPL-MCNC: 5 MG/L (ref 0–10)
EGFRCR SERPLBLD CKD-EPI 2021: 83 ML/MIN/1.73
ERYTHROCYTE [DISTWIDTH] IN BLOOD BY AUTOMATED COUNT: 13 % (ref 11.6–15.4)
GLOBULIN SER CALC-MCNC: 2.3 G/DL (ref 1.5–4.5)
GLUCOSE SERPL-MCNC: 82 MG/DL (ref 70–99)
HCT VFR BLD AUTO: 50.5 % (ref 37.5–51)
HGB BLD-MCNC: 16.8 G/DL (ref 13–17.7)
MCH RBC QN AUTO: 30.5 PG (ref 26.6–33)
MCHC RBC AUTO-ENTMCNC: 33.3 G/DL (ref 31.5–35.7)
MCV RBC AUTO: 92 FL (ref 79–97)
PLATELET # BLD AUTO: 241 X10E3/UL (ref 150–450)
POTASSIUM SERPL-SCNC: 4 MMOL/L (ref 3.5–5.2)
PROT SERPL-MCNC: 6.9 G/DL (ref 6–8.5)
RBC # BLD AUTO: 5.51 X10E6/UL (ref 4.14–5.8)
SODIUM SERPL-SCNC: 142 MMOL/L (ref 134–144)
TSH SERPL DL<=0.005 MIU/L-ACNC: 0.66 UIU/ML (ref 0.45–4.5)
WBC # BLD AUTO: 10.9 X10E3/UL (ref 3.4–10.8)

## 2025-06-17 ENCOUNTER — TELEPHONE (OUTPATIENT)
Dept: UROLOGY | Facility: CLINIC | Age: 45
End: 2025-06-17

## 2025-06-17 NOTE — TELEPHONE ENCOUNTER
Provider: EWELINA MERLOS    Caller: Evaristo Colvin    Relationship to Patient: Self    Pharmacy: [id number]    Phone Number: [phone]    Reason for Call: PT IS SCHEDULE FOR FU APPT ON 8/21/25 AND IS OUT OF HIS TESTOSTERONE.  PT HAS REQUESTED FOR MEDICATION TO BE CALLED IN TO COVER UNTIL HIS APPT.    PT ATTEMPTED TO HAVE LABS COMPLETED HOWEVER NO LABS ARE ACTIVE.    PLEASE SUBMIT LAB ORDERS, AND CALL PT TO CONFIRM ORDERS HAVE BEEN SUBMITTED AND IF MED CAN BE REFILLED.    When was the patient last seen: 10/24/24

## 2025-06-18 DIAGNOSIS — E29.1 HYPOGONADISM MALE: ICD-10-CM

## 2025-06-18 DIAGNOSIS — E29.1 HYPOGONADISM IN MALE: Primary | ICD-10-CM

## 2025-06-18 RX ORDER — TESTOSTERONE CYPIONATE 200 MG/ML
100 INJECTION, SOLUTION INTRAMUSCULAR WEEKLY
Qty: 4 ML | Refills: 0 | Status: SHIPPED | OUTPATIENT
Start: 2025-06-18

## 2025-08-19 ENCOUNTER — LAB (OUTPATIENT)
Dept: LAB | Facility: HOSPITAL | Age: 45
End: 2025-08-19
Payer: COMMERCIAL

## 2025-08-19 DIAGNOSIS — E29.1 HYPOGONADISM IN MALE: ICD-10-CM

## 2025-08-19 LAB
DEPRECATED RDW RBC AUTO: 42.9 FL (ref 37–54)
ERYTHROCYTE [DISTWIDTH] IN BLOOD BY AUTOMATED COUNT: 13 % (ref 12.3–15.4)
HCT VFR BLD AUTO: 44.1 % (ref 37.5–51)
HGB BLD-MCNC: 14.7 G/DL (ref 13–17.7)
MCH RBC QN AUTO: 30.3 PG (ref 26.6–33)
MCHC RBC AUTO-ENTMCNC: 33.3 G/DL (ref 31.5–35.7)
MCV RBC AUTO: 90.9 FL (ref 79–97)
PLATELET # BLD AUTO: 221 10*3/MM3 (ref 140–450)
PMV BLD AUTO: 10.3 FL (ref 6–12)
RBC # BLD AUTO: 4.85 10*6/MM3 (ref 4.14–5.8)
TESTOST SERPL-MCNC: >1500 NG/DL (ref 249–836)
WBC NRBC COR # BLD AUTO: 8.34 10*3/MM3 (ref 3.4–10.8)

## 2025-08-19 PROCEDURE — 85027 COMPLETE CBC AUTOMATED: CPT

## 2025-08-19 PROCEDURE — 36415 COLL VENOUS BLD VENIPUNCTURE: CPT

## 2025-08-19 PROCEDURE — 84403 ASSAY OF TOTAL TESTOSTERONE: CPT

## (undated) DEVICE — BNDG ELAS CO-FLEX SLF ADHR 4IN5YD LF STRL

## (undated) DEVICE — PK MAJ SHLDR SPLT 10

## (undated) DEVICE — GLV SURG SENSICARE PI MIC PF SZ7.5 LF STRL

## (undated) DEVICE — SUT ETHLN 3/0 FS1 30IN 669H

## (undated) DEVICE — 450 ML BOTTLE OF 0.05% CHLORHEXIDINE GLUCONATE IN 99.95% STERILE WATER FOR IRRIGATION, USP AND APPLICATOR.: Brand: IRRISEPT ANTIMICROBIAL WOUND LAVAGE

## (undated) DEVICE — BLANKT WARM LOWR/BDY 100X120CM

## (undated) DEVICE — TBG PENCL TELESCP MEGADYNE SMOKE EVAC 10FT

## (undated) DEVICE — BNDG,ELSTC,MATRIX,STRL,6"X5YD,LF,HOOK&LP: Brand: MEDLINE

## (undated) DEVICE — PATIENT RETURN ELECTRODE, SINGLE-USE, CONTACT QUALITY MONITORING, ADULT, WITH 9FT CORD, FOR PATIENTS WEIGING OVER 33LBS. (15KG): Brand: MEGADYNE

## (undated) DEVICE — GOWN,NON-REINFORCED,SIRUS,SET IN SLV,XL: Brand: MEDLINE

## (undated) DEVICE — GLV SURG SENSICARE PI MIC PF SZ6.5 LF STRL

## (undated) DEVICE — BANDAGE,ELASTIC,ESMARK,STERILE,4"X9',LF: Brand: MEDLINE

## (undated) DEVICE — UNDERGLV SURG BIOGEL INDICAT PF 61/2 GRN

## (undated) DEVICE — UNDERCAST PADDING: Brand: DEROYAL

## (undated) DEVICE — GLV SURG SENSICARE PI ORTHO SZ7.5 LF STRL

## (undated) DEVICE — ARM SLING: Brand: DEROYAL

## (undated) DEVICE — DISPOSABLE TOURNIQUET CUFF SINGLE BLADDER, DUAL PORT AND QUICK CONNECT CONNECTOR: Brand: COLOR CUFF

## (undated) DEVICE — ELECTRD BLD EZ CLN STD 6.5IN

## (undated) DEVICE — SKN PREP SPNG STKS PVP PNT STR: Brand: MEDLINE INDUSTRIES, INC.

## (undated) DEVICE — ELECTRD BLD EZ CLN STD 2.5IN

## (undated) DEVICE — BNDG,ELSTC,MATRIX,STRL,4"X5YD,LF,HOOK&LP: Brand: MEDLINE

## (undated) DEVICE — UNDERGLV SURG BIOGEL INDICAT PI SZ8 BLU

## (undated) DEVICE — PREP SOL POVIDONE/IODINE BT 4OZ

## (undated) DEVICE — KT PUMP INFUBLOCK MDL 2100 PMKITSOLIS

## (undated) DEVICE — SOL ISO/ALC RUB 70PCT 4OZ